# Patient Record
Sex: FEMALE | Race: WHITE | NOT HISPANIC OR LATINO | Employment: PART TIME | URBAN - METROPOLITAN AREA
[De-identification: names, ages, dates, MRNs, and addresses within clinical notes are randomized per-mention and may not be internally consistent; named-entity substitution may affect disease eponyms.]

---

## 2021-04-27 ENCOUNTER — TRANSFERRED RECORDS (OUTPATIENT)
Dept: HEALTH INFORMATION MANAGEMENT | Facility: CLINIC | Age: 26
End: 2021-04-27

## 2021-04-27 LAB — PAP-ABSTRACT: NORMAL

## 2021-05-05 ENCOUNTER — OFFICE VISIT (OUTPATIENT)
Dept: URGENT CARE | Facility: URGENT CARE | Age: 26
End: 2021-05-05
Payer: COMMERCIAL

## 2021-05-05 VITALS
OXYGEN SATURATION: 97 % | TEMPERATURE: 98.4 F | HEART RATE: 88 BPM | DIASTOLIC BLOOD PRESSURE: 74 MMHG | SYSTOLIC BLOOD PRESSURE: 113 MMHG | RESPIRATION RATE: 16 BRPM

## 2021-05-05 DIAGNOSIS — R07.0 THROAT PAIN: ICD-10-CM

## 2021-05-05 DIAGNOSIS — R50.9 FEVER AND CHILLS: Primary | ICD-10-CM

## 2021-05-05 DIAGNOSIS — J06.9 VIRAL URI: ICD-10-CM

## 2021-05-05 LAB
DEPRECATED S PYO AG THROAT QL EIA: NEGATIVE
SARS-COV-2 RNA RESP QL NAA+PROBE: NORMAL
SPECIMEN SOURCE: NORMAL
STREP GROUP A PCR: NOT DETECTED

## 2021-05-05 PROCEDURE — 99203 OFFICE O/P NEW LOW 30 MIN: CPT | Performed by: FAMILY MEDICINE

## 2021-05-05 PROCEDURE — 87651 STREP A DNA AMP PROBE: CPT | Performed by: FAMILY MEDICINE

## 2021-05-05 PROCEDURE — U0005 INFEC AGEN DETEC AMPLI PROBE: HCPCS | Performed by: FAMILY MEDICINE

## 2021-05-05 PROCEDURE — U0003 INFECTIOUS AGENT DETECTION BY NUCLEIC ACID (DNA OR RNA); SEVERE ACUTE RESPIRATORY SYNDROME CORONAVIRUS 2 (SARS-COV-2) (CORONAVIRUS DISEASE [COVID-19]), AMPLIFIED PROBE TECHNIQUE, MAKING USE OF HIGH THROUGHPUT TECHNOLOGIES AS DESCRIBED BY CMS-2020-01-R: HCPCS | Performed by: FAMILY MEDICINE

## 2021-05-05 PROCEDURE — 99N1174 PR STATISTIC STREP A RAPID: Performed by: FAMILY MEDICINE

## 2021-05-05 RX ORDER — SUMATRIPTAN 50 MG/1
TABLET, FILM COATED ORAL PRN
COMMUNITY
Start: 2021-04-21 | End: 2022-09-29

## 2021-05-05 RX ORDER — ALBUTEROL SULFATE 90 UG/1
AEROSOL, METERED RESPIRATORY (INHALATION) PRN
COMMUNITY
Start: 2021-04-19 | End: 2022-09-29

## 2021-05-05 NOTE — PROGRESS NOTES
ASSESSMENT/  PLAN:  Fever and chills     - Symptomatic COVID-19 Virus (Coronavirus) by PCR  - Group A Streptococcus PCR Throat Swab    Throat pain     - Streptococcus A Rapid Scr w Reflx to PCR    Viral URI      discussed with the patient that a confirmatory strep culture will be performed and that she will be called if the culture is positive for strep.  We discussed other possible causes of pharyngitis including cold viruses , Covid 19 and mononucleosis.   The limitations of the mono test was discussed and that late and/or repeated testing is sometimes necessary when mononucleosis is the cause of the illness    If the test for Covid 19 is positive will need to quarantine at least 10 days and may return to work/ school if fever free at least 1 day     Symptomatic treat with gargles, lozenges, and OTC analgesic as needed. Follow-up with primary clinic if not improving.    --------------------------------------------------------------------------------------------------------------------------------    SUBJECTIVE:  Chief Complaint   Patient presents with     Fever     Fever since yesterday     Pharyngitis     Sore throat since Monday, heard to swallow. Sinus headache that moves to both ears.     Cele Kim is a 26 year old female   with a chief complaint of sore throat.  Onset of symptoms was 3 day(s) ago.    Course of illness: gradual onset, still present, worsening and constant.  Severity moderate  Current and Associated symptoms: fever, stuffy nose and sore throat  Treatment measures tried include Tylenol/Ibuprofen.  Predisposing factors include ill contact: possibly at a wedding.    PMH:  No history of chronic health conditions    ALLERGIES:  Patient has no known allergies.      MEDs  SUMAtriptan (IMITREX) 50 MG tablet, as needed  VENTOLIN  (90 Base) MCG/ACT inhaler, as needed    No current facility-administered medications on file prior to visit.       Social History     Tobacco Use     Smoking  status: Never Smoker     Smokeless tobacco: Never Used   Substance Use Topics     Alcohol use: Not on file          ROS:  CONSTITUTIONAL:  fever, chills,   INTEGUMENTARY/SKIN: NEGATIVE for worrisome rashes  or lesions  EYES: NEGATIVE for vision changes or irritation  RESP:NEGATIVE for significant cough or SOB  GI: NEGATIVE for nausea, abdominal pain, or change in bowel habits    OBJECTIVE:   /74   Pulse 88   Temp 98.4  F (36.9  C) (Oral)   Resp 16   SpO2 97%   Breastfeeding No   GENERAL APPEARANCE: alert, mild distress and cooperative  EYES: EOMI,  PERRL, conjunctiva clear  HENT: ear canals and TM's normal.  Nose normal.  Pharynx erythematous with no exudate noted.  NECK: supple, non-tender to palpation, no adenopathy noted  RESP: lungs clear to auscultation - no rales, rhonchi or wheezes  CV: regular rates and rhythm, normal S1 S2, no murmur noted  SKIN: no suspicious lesions or rashes    Results for orders placed or performed in visit on 05/05/21   Streptococcus A Rapid Scr w Reflx to PCR     Status: None    Specimen: Throat   Result Value Ref Range    Strep Specimen Description Throat     Streptococcus Group A Rapid Screen Negative NEG^Negative

## 2021-05-06 LAB
LABORATORY COMMENT REPORT: NORMAL
SARS-COV-2 RNA RESP QL NAA+PROBE: NEGATIVE
SPECIMEN SOURCE: NORMAL

## 2021-06-19 ENCOUNTER — HEALTH MAINTENANCE LETTER (OUTPATIENT)
Age: 26
End: 2021-06-19

## 2021-10-09 ENCOUNTER — HEALTH MAINTENANCE LETTER (OUTPATIENT)
Age: 26
End: 2021-10-09

## 2021-11-04 ENCOUNTER — OFFICE VISIT (OUTPATIENT)
Dept: MIDWIFE SERVICES | Facility: CLINIC | Age: 26
End: 2021-11-04
Payer: COMMERCIAL

## 2021-11-04 VITALS
HEIGHT: 64 IN | HEART RATE: 76 BPM | WEIGHT: 170.6 LBS | BODY MASS INDEX: 29.12 KG/M2 | SYSTOLIC BLOOD PRESSURE: 118 MMHG | DIASTOLIC BLOOD PRESSURE: 70 MMHG

## 2021-11-04 DIAGNOSIS — Z97.5 IUD (INTRAUTERINE DEVICE) IN PLACE: ICD-10-CM

## 2021-11-04 DIAGNOSIS — R10.2 PELVIC PAIN IN FEMALE: Primary | ICD-10-CM

## 2021-11-04 DIAGNOSIS — N76.0 BV (BACTERIAL VAGINOSIS): Primary | ICD-10-CM

## 2021-11-04 DIAGNOSIS — R53.83 LOW ENERGY: ICD-10-CM

## 2021-11-04 DIAGNOSIS — B96.89 BV (BACTERIAL VAGINOSIS): Primary | ICD-10-CM

## 2021-11-04 DIAGNOSIS — Z13.29 SCREENING FOR THYROID DISORDER: ICD-10-CM

## 2021-11-04 LAB
CLUE CELLS: PRESENT
TRICHOMONAS, WET PREP: ABNORMAL
WBC'S/HIGH POWER FIELD, WET PREP: ABNORMAL
YEAST, WET PREP: ABNORMAL

## 2021-11-04 PROCEDURE — 36415 COLL VENOUS BLD VENIPUNCTURE: CPT | Performed by: ADVANCED PRACTICE MIDWIFE

## 2021-11-04 PROCEDURE — 84443 ASSAY THYROID STIM HORMONE: CPT | Performed by: ADVANCED PRACTICE MIDWIFE

## 2021-11-04 PROCEDURE — 87210 SMEAR WET MOUNT SALINE/INK: CPT | Performed by: ADVANCED PRACTICE MIDWIFE

## 2021-11-04 PROCEDURE — 99204 OFFICE O/P NEW MOD 45 MIN: CPT | Performed by: ADVANCED PRACTICE MIDWIFE

## 2021-11-04 RX ORDER — COPPER 313.4 MG/1
1 INTRAUTERINE DEVICE INTRAUTERINE
COMMUNITY
Start: 2020-07-08 | End: 2022-05-05

## 2021-11-04 RX ORDER — FLUCONAZOLE 150 MG/1
150 TABLET ORAL ONCE
Qty: 1 TABLET | Refills: 0 | Status: SHIPPED | OUTPATIENT
Start: 2021-11-04 | End: 2021-11-04

## 2021-11-04 RX ORDER — METRONIDAZOLE 500 MG/1
500 TABLET ORAL 2 TIMES DAILY
Qty: 14 TABLET | Refills: 0 | Status: SHIPPED | OUTPATIENT
Start: 2021-11-04 | End: 2021-11-11

## 2021-11-04 ASSESSMENT — MIFFLIN-ST. JEOR: SCORE: 1508.84

## 2021-11-04 NOTE — Clinical Note
Please abstract the following data from this visit with this patient into the appropriate field in Epic:    Other Tests found in the patient's chart through Chart Review/Care Everywhere:    Pap smear done by [facility] this date: 4/27/21

## 2021-11-04 NOTE — PROGRESS NOTES
"  SUBJECTIVE:                                                   Loraine Kim is a 26 year old who presents to clinic today for the following health issue(s):  Patient presents with:  Pelvic Pain: c/o right side pelvic pain for about 1 month off and on and is getting worse, as well as some painful intercourse; has had ovarian cysts in past and it doesn't feel the same    Discuss PCOS, pt questions if any of her sx are from that. Never been dx. See cycle history below.    Would also like to discuss getting thyroid tested due to family hx of hypothyroidism. C/o low energy.    HPI:  Here for evaluation for pelvic pain. Pain is on  R side of abdomen,  achy in nature. Has been happening off/on for a month and getting worse. Feels area of pain is \"walnut\" size. \"Not bigger but maybe \"angerier.\" Last night, when coughing, \"felt a small water balloon, I can feel it moving and it hurt when it moved.\"  When pain occurs, occurs in 1-2 minute bursts. When days are worse, it happens every 15 minutes for a couple hours and then goes away.   If pain is already present, walking will make it worse. Sometimes pain randomly happens. No activity she feels brings the discomfort on.     Occasionally use Tylenol or Advil, or a heating pad. Heating pad seems to help. Does not like taking Advil much, gives her upset stomach    Pain with sex. \"Feels like \"it\" gets hit and then it starts hurting. No bleeding during sex. Painful in all sexual positions. Does not hurt everytime she has sex. Does not continue after sex.     Has had ovarian cysts before. This pain feels different than before. Ovarian cyst she had before was on her L side.     Has had yeast infections since getting Paragard. Has probably had 5. Paragard placed July 2020.  Standing prescription at [facility] for Diflucan. She wonders if there is something more she can do to prevent yeast infections.     Denies history of STDs. Suggested STD screening today, but declined.     Also " requesting a TSH level drawn today. Has a family hx of hypothyroidism and c/o low energy. Has never had a thyroid level drawn.     Patient's last menstrual period was 10/14/2021 (exact date).  Menstrual History: irregular, wonders about possibly having PCOS. Cycles are q 45 days, lasting 7 days.  Medium flow.  Patient is sexually active  No obstetric history on file.  Using IUD for contraception. [facility] updated:  Yes    Please abstract the following data from this visit with this patient into the appropriate field in Epic:    Other Tests found in the patient's chart through Chart Review/Care Everywhere:    Pap smear done by this group [facility] on this date: 4/27/21        PHQ-2 Score:     PHQ-2 ( 1999 Pf[employer]) 11/4/2021   Q1: Little interest or pleasure in doing things 0   Q2: Feeling down, depressed or hopeless 0   PHQ-2 Score 0           Problem list and histories reviewed & adjusted, as indicated.  Additional history: as documented.    There is no problem list on file for this patient.    Past Surgical History:   Procedure Laterality Date     WISDOM TOOTH EXTRACTION        Social History     Tobacco Use     Smoking status: Never Smoker     Smokeless tobacco: Never Used   Substance Use Topics     Alcohol use: Not on file      Problem (# of Occurrences) Relation (Name,Age of Onset)    Diabetes (1) Paternal Grandmother    Gout (1) Father    Hypothyroidism (2) Cousin, Maternal Aunt    Kidney Disease (1) Mother    Myocardial Infarction (1) Paternal Grandfather    No Known Problems (4) Sister, Brother, Maternal Grandmother, Maternal Grandfather            Current Outpatient Medications   Medication Sig     Multiple Vitamin (MULTIVITAMIN PO)      paragard intrauterine copper device 1 Device by Intrauterine route     SUMAtriptan (IMITREX) 50 MG tablet as needed     VENTOLIN  (90 Base) MCG/ACT inhaler as needed     metroNIDAZOLE (FLAGYL) 500 MG tablet Take 1 tablet (500 mg) by mouth 2  "times daily for 7 days     No current facility-administered medications for this visit.     No Known Allergies    ROS:  Genitourinary: Cramps, Irregular Menses and Pelvic Pain  Endocrine: Excess Hair Growth  12 point review of systems negative other than symptoms noted below.    OBJECTIVE:     /70   Pulse 76   Ht 1.626 m (5' 4\")   Wt 77.4 kg (170 lb 9.6 oz)   LMP 10/14/2021 (Exact Date)   BMI 29.28 kg/m    Body mass index is 29.28 kg/m .    PHYSICAL EXAM:  Constitutional:  Appearance: Well nourished, well developed alert, in no acute distress  Pelvic Exam:  External Genitalia:     Normal appearance for age, no discharge present, no tenderness present, no inflammatory lesions present, color normal  Vagina:    Normal vaginal vault without central or paravaginal defects, no discharge present, no inflammatory lesions present, no masses present  Bladder:     Nontender to palpation  Urethra:   Urethral Body:  Urethra palpation normal, urethra structural support normal   Urethral Meatus:  No erythema or lesions present  Cervix:     Appearance healthy, no lesions present, nontender to palpation, no bleeding present, strings present  Uterus:     Nontender to palpation, no masses present, position anteflexed, mobility: normal  Adnexa:      adnexal tenderness present on R side, no adnexal masses palpated  Perineum:     Perineum within normal limits, no evidence of trauma, no rashes or skin lesions present  Pubic Hair:     Normal pubic hair distribution for age  Genitalia and Groin:     No rashes present, no lesions present, no areas of discoloration, no masses present       In-Clinic Test Results:  No results found for this or any previous visit (from the past 24 hour(s)).    ASSESSMENT/PLAN:                                                        ICD-10-CM    1. Pelvic pain in female  R10.2 US Transvaginal Non OB     Wet prep - lab collect     Wet prep - lab collect   2. Screening for thyroid disorder  Z13.29 TSH " with free T4 reflex     TSH with free T4 reflex   3. IUD (intrauterine device) in place  Z97.5    4. Low energy  R53.83        COUNSELING:  Begin with evaluation of pelvic pain today. Will need a separate appt to discuss PCOS further.  Given 45 day menstrual cycles this may be of concern.    TSH level drawn today for c/o low energy. Discussed if abnormal, may be contributing to long menstrual cycles    Paragard in place, did not feel the need to complete a urine hcg. Last menstrual cycle was on 10/14/2021    Not due for a Pap    Wet prep to r/o vaginal infection. Discussed boric acid therapy.    Discussed US for further w/u of pelvic pain. May need to f/u with MD if abnormal.     30 minutes spent on the date of the encounter doing chart review, review of test results, patient visit and documentation     Lili ANDERSEN CNM

## 2021-11-05 LAB — TSH SERPL DL<=0.005 MIU/L-ACNC: 2.5 MU/L (ref 0.4–4)

## 2021-11-15 ENCOUNTER — IMMUNIZATION (OUTPATIENT)
Dept: NURSING | Facility: CLINIC | Age: 26
End: 2021-11-15
Payer: COMMERCIAL

## 2021-11-15 PROCEDURE — 90471 IMMUNIZATION ADMIN: CPT

## 2021-11-15 PROCEDURE — 0064A COVID-19,PF,MODERNA (18+ YRS BOOSTER .25ML): CPT

## 2021-11-15 PROCEDURE — 91306 COVID-19,PF,MODERNA (18+ YRS BOOSTER .25ML): CPT

## 2021-11-15 PROCEDURE — 90686 IIV4 VACC NO PRSV 0.5 ML IM: CPT

## 2021-11-18 ENCOUNTER — HOSPITAL ENCOUNTER (OUTPATIENT)
Dept: ULTRASOUND IMAGING | Facility: CLINIC | Age: 26
Discharge: HOME | End: 2021-11-18
Attending: ADVANCED PRACTICE MIDWIFE | Admitting: ADVANCED PRACTICE MIDWIFE
Payer: COMMERCIAL

## 2021-11-18 DIAGNOSIS — R10.2 PELVIC PAIN IN FEMALE: ICD-10-CM

## 2021-11-18 PROCEDURE — 76830 TRANSVAGINAL US NON-OB: CPT

## 2021-12-03 ENCOUNTER — NURSE TRIAGE (OUTPATIENT)
Dept: NURSING | Facility: CLINIC | Age: 26
End: 2021-12-03
Payer: COMMERCIAL

## 2021-12-03 NOTE — TELEPHONE ENCOUNTER
"Patient reports this morning she had \"horrible cramps\", felt like \"period cramps\", pain 7/10    Currently pain in abdomen 0/10.    Nick IUD was placed on July 7, 2020.  Patient noted today that \"strings are not present\".  Denies vaginal bleeding.    Patient reports history of sharp pain to right side of abdomen that led to ultra sound on 11/18/2021.  Patient states that Provider stated that \"IUD placement was at an angle\"    According to the protocol, patient should be seen within three days in office.  Care advice given. Patient verbalizes understanding and agrees with plan of care. Transferred to scheduling.     Shanon Johnson RN  12/03/21 3:44 PM  [facility] Nurse Advisor     COVID 19 Nurse Triage Plan/Patient Instructions    Please be aware that novel coronavirus (COVID-19) may be circulating in the community. If you develop symptoms such as fever, cough, or SOB or if you have concerns about the presence of another infection including coronavirus (COVID-19), please contact your health care provider or visit [email or web address].     Disposition/Instructions    In-Person Visit with provider recommended. Reference Visit Selection Guide.    Thank you for taking steps to prevent the spread of this virus.  o Limit your contact with others.  o Wear a simple mask to cover your cough.  o Wash your hands well and often.    Resources    [facility]: About COVID-19: [email or web address]    CDC: What to Do If You're Sick: www.cdc.gov/coronavirus/2019-ncov/about/steps-when-sick.html    CDC: Ending Home Isolation: www.cdc.gov/coronavirus/2019-ncov/hcp/disposition-in-home-patients.html     CDC: Caring for Someone: www.cdc.gov/coronavirus/2019-ncov/if-you-are-sick/care-for-someone.html     [facility]: Interim Guidance for Hospital Discharge to Home: www.health.[facility].[place]/diseases/coronavirus/hcp/hospdischarge.pdf    [facility] clinical trials (COVID-19 research studies): " clinicalaffairs.[facility]-clinical-trials     Below are the COVID-19 hotlines at the [place] Department of Health ([facility]). Interpreters are available.   o For health questions: Call 764-687-1075 or 1-798.375.8113 (7 a.m. to 7 p.m.)  o For questions about schools and childcare: Call 803-942-7451 or 1-718.457.9582 (7 a.m. to 7 p.m.)     Reason for Disposition    Worried that IUD is not in right place (e.g., not able to feel the IUD string, string longer than usual, can feel hard plastic of IUD)    Additional Information    Negative: Shock suspected (e.g., cold/pale/clammy skin, too weak to stand, low BP, rapid pulse)    Negative: Sounds like a life-threatening emergency to the triager    Negative: Abdominal pain and pregnant < 20 weeks    Negative: Vaginal bleeding and pregnant < 20 weeks    Negative: Vaginal discharge is main symptom    Negative: SEVERE abdominal pain (e.g., excruciating) and present > 1 hour    Negative: SEVERE vaginal bleeding (e.g., soaking 2 pads or tampons per hour) and present 2 or more hours    Negative: MODERATE vaginal bleeding (e.g., soaking 1 pad or tampon per hour and present > 6 hours)    Negative: Constant abdominal pain and present > 2 hours    Negative: Patient sounds very sick or weak to the triager    Negative: Caller has URGENT question and triager unable to answer question    Negative: MILD abdominal pain (e.g., does not interfere with normal activities) that comes and goes (cramps) and present > 48 hours    Negative: Pregnant    Negative: Periods with > 6 soaked pads or tampons per day and lasts > 7 days    Protocols used: CONTRACEPTION - IUD SYMPTOMS AND [id number]

## 2021-12-06 ENCOUNTER — OFFICE VISIT (OUTPATIENT)
Dept: OBGYN | Facility: CLINIC | Age: 26
End: 2021-12-06
Payer: COMMERCIAL

## 2021-12-06 VITALS
DIASTOLIC BLOOD PRESSURE: 62 MMHG | BODY MASS INDEX: 28.94 KG/M2 | WEIGHT: 168.6 LBS | SYSTOLIC BLOOD PRESSURE: 109 MMHG | OXYGEN SATURATION: 97 % | HEART RATE: 60 BPM

## 2021-12-06 DIAGNOSIS — Z30.432 ENCOUNTER FOR REMOVAL OF INTRAUTERINE CONTRACEPTIVE DEVICE: ICD-10-CM

## 2021-12-06 DIAGNOSIS — Z30.09 BIRTH CONTROL COUNSELING: Primary | ICD-10-CM

## 2021-12-06 PROCEDURE — 99213 OFFICE O/P EST LOW 20 MIN: CPT | Mod: 25 | Performed by: OBSTETRICS & GYNECOLOGY

## 2021-12-06 PROCEDURE — 58301 REMOVE INTRAUTERINE DEVICE: CPT | Performed by: OBSTETRICS & GYNECOLOGY

## 2021-12-06 RX ORDER — LEVONORGESTREL 1.5 MG/1
1.5 TABLET ORAL ONCE
Qty: 1 TABLET | Refills: 3 | Status: SHIPPED | OUTPATIENT
Start: 2021-12-06 | End: 2022-09-29

## 2021-12-06 NOTE — PATIENT INSTRUCTIONS
Patient Education     Birth Control Methods  Birth control methods are used to help prevent pregnancy. There are many different methods to choose from. Talk with your healthcare provider about which method is right for you. Be sure to ask your provider how well each one works. Also ask about the benefits, risks, and side effects of each method.   Hormones  Some birth control methods work by releasing hormones such as progestin and estrogen. These methods include hormone implants, hormone shots, the vaginal ring, the patch, and birth control pills. They all work by stopping the release of the egg from the ovary (ovulation). All of these methods work well and can be stopped at any time.     The implant is a small device that needs to be placed in the upper arm by a trained healthcare provider. It works for up to 3 years.    Hormone injections must be repeated every 3 months.    The vaginal ring must be replaced monthly. It can be removed during the fourth week of each cycle.    The patch must be replaced weekly. It's not worn during the fourth week of each cycle.    Birth control pills must be taken every day.  Intrauterine device (IUD)  An IUD is a small, T-shaped device. It must be placed in the uterus by a trained healthcare provider. There are different types of IUDs available. They work by causing changes in the uterus that make it harder for sperm to reach the egg. Depending on the type of IUD you have, it may work for several years or longer. The IUD is a reversible birth control method. This means it can be removed at any time.   Condom  A condom is a sheath that forms a thin barrier between the penis and the vagina. It helps prevent pregnancy by keeping sperm from entering the vagina. When latex condoms are used, they have the added benefit of protecting against most STIs (sexually transmitted infections). Condoms are used each time there is sexual intercourse and should be discarded after each use. Ask  your healthcare provider about the different types of condoms available. These include both the male condom and female condom.   Spermicide  Spermicides come as foams, jellies, creams, suppositories, and tablets.  They help prevent pregnancy by killing sperm. When used alone they are not that reliable. They work best when combined with other birth control methods such as diaphragms and cervical caps.   Sponge, diaphragm, and cervical cap   All of these methods help prevent pregnancy by covering the opening of the uterus (cervix). This prevents sperm from passing through.   The sponge contains spermicide. It can be bought over the counter. The sponge must be left in place for at least 6 hours after the last time you have sex. However, it should not stay in place for more than 24 hours. Discard after use.   The diaphragm and cervical cap must be fitted and prescribed by your healthcare provider. Both are used with spermicide. The diaphragm must be left in place for at least 6 hours after sex. However, it should not stay in place for more than 24 hours. It can be washed and reused. The cervical cap must be left in place for at least 6 hours after sex. However, it should not stay in place for more than 48 hours. It can be washed and reused.   Withdrawal method  This is when the man pulls his penis out of the vagina just before ejaculation ( coming ). This lowers the amount of sperm entering the vagina. Be aware that fluids released just before ejaculation often still contain some sperm, so this method is not as reliable as certain other methods.   Rhythm method   This method is also call natural family planning or fertility awareness. It requires that you know when in your menstrual cycle you are likely to become pregnant. Then you not have sex during those days. This requires careful planning and good discipline. Your healthcare provider can explain more about how this works.   Tubal ligation and vasectomy  These are  surgical methods to prevent pregnancy. Tubal ligation is an option for women. The fallopian tubes are blocked or cut (ligated). This keeps the egg from passing into the uterus or sperm from reaching the egg. Vasectomy is an option for men. The tubes that normally carry sperm to the penis are either closed or blocked. Both tubal ligation and vasectomy are permanent birth control methods. This means reversal is either not possible or unlikely to work. They are good choices for women and men who know that they don't want to have children in the future.   [employer] last reviewed this educational content on 7/1/2020 2000-2021 [employer]. All rights reserved. This information is not intended as a substitute for professional medical care. Always follow your healthcare professional's instructions.

## 2021-12-06 NOTE — PROGRESS NOTES
[facility]- OBGYN    CC: birth control consult.  Ultrasound review    S:Loraine Kim is a 26 year old  who presents today for ultrasound review and birth control consult.      Per chart review:  2020- patient had Paragard IUD inserted  21- patient complained of IUD strings long, trimmed  21- patient seen for pain with intercourse.  21-  US PELVIC COMPLETE WITH TRANSVAGINAL 2021 3:24 PM     CLINICAL HISTORY: Pelvic pain.  TECHNIQUE: Transabdominal scans were performed. Endovaginal ultrasound  was performed to better visualize the adnexa.     COMPARISON: 10/6/2017.     FINDINGS:  UTERUS: 6.1 x 4.3 x 3 cm. Normal in size and position with no masses.  ENDOMETRIUM: IUD appears positioned somewhat obliquely within the  lower uterine segment. The endometrium is unremarkable where  visualized, measuring 3 mm.  RIGHT OVARY: 3.9 x 2.6 x 2.1 cm. Normal with flow demonstrated.  LEFT OVARY: 3.1 x 3.2 x 2.1 cm. Normal with flow demonstrated   No significant free fluid.                                                             IMPRESSION:  1.  IUD appears positioned somewhat obliquely in the lower uterine  segment.  2.  Otherwise unremarkable pelvic ultrasound examination.  MAYURI ANTONIO MD      Patient reports she continues to have sharp intermittent right sided pelvic pain with intercourse.  She also notes numerous yeast infections with this paragard IUD.  She has used the Mirena IUD in the past and had numerous ovarian cysts with its use.  She did not like the way the POPs affected her mood.     OBGYN Hx:   OB History    Para Term  AB Living   0 0 0 0 0 0   SAB IAB Ectopic Multiple Live Births   0 0 0 0 0       Patient's last menstrual period was 2021 (exact date).  Menses:regular, q30-40 days.  Lasts 6-7 days  Sexually active with male partner  STD Hx:none  Pap hx:21 NIL    PMH: asthma, migraines with aura  PSH: tooth  extraction  Meds:imitrex  Allergies:NKDA  SH: Denies any tobacco or drug use.  Consumes 4 drinks per week.    O: Patient Vitals for the past 24 hrs:   BP Pulse SpO2 Weight   21 0815 109/62 60 97 % 76.5 kg (168 lb 9.6 oz)   ]  Exam:  General- awake, alert, answering questions appropriately, appears comfortable  CV- regular rate  Lung- breathing comfortably on room air  - normal appearing external genitalia, normal appearing vaginal mucosa, normal appearing cervix with IUD strings visualized.  Small amount of thin white vaginal discharge.    A&P: Loraine Kim is a 26 year old  who presents today for ultrasound review and birth control consult.      (Z30.09) Birth control counseling  (primary encounter diagnosis)  Comment: Reviewed ultrasound findings with patient and given pain and IUD in incorrect position, recommend removal.  Patient has history of migraines with aura, thus estrogen containing contraception is not safe option.  Discussed options including replacement of IUD (copper or levonorgestrel) vs. Depo provera vs. POPs vs. Nexplanon vs. Condoms.  Following discussion patient would prefer IUD removed and condoms for birth control.  Discussed condom use 100% of the time and Plan B on hand if condom failure.  Patient agrees  Plan: levonorgestrel (PLAN B) 1.5 MG tablet        See below procedure for IUD removal.     (Z30.432) Encounter for removal of intrauterine contraceptive device  Comment: IUD in incorrect position and causing pain.   Plan: REMOVE INTRAUTERINE DEVICE      IUD Removal:  SUBJECTIVE:    Is a pregnancy test required: No.  Was a consent obtained?  Yes    PROCEDURE:    A speculum exam was performed and the cervix was visualized. The IUD string was visualized. Using ring forceps, the string was grasped and the IUD removed intact.    POST PROCEDURE:    The patient tolerated the procedure well. Patient was discharged in stable condition.    Call if bleeding, pain or fever occur. and  Birth control counseling given.    Tram Hinds MD

## 2022-05-05 ENCOUNTER — OFFICE VISIT (OUTPATIENT)
Dept: OBGYN | Facility: CLINIC | Age: 27
End: 2022-05-05
Payer: COMMERCIAL

## 2022-05-05 VITALS
HEIGHT: 64 IN | DIASTOLIC BLOOD PRESSURE: 62 MMHG | WEIGHT: 169 LBS | SYSTOLIC BLOOD PRESSURE: 122 MMHG | BODY MASS INDEX: 28.85 KG/M2

## 2022-05-05 DIAGNOSIS — N89.8 VAGINAL DISCHARGE: Primary | ICD-10-CM

## 2022-05-05 LAB
CLUE CELLS: NORMAL
TRICHOMONAS, WET PREP: NORMAL
WBC'S/HIGH POWER FIELD, WET PREP: NORMAL
YEAST, WET PREP: NORMAL

## 2022-05-05 PROCEDURE — 99213 OFFICE O/P EST LOW 20 MIN: CPT | Performed by: NURSE PRACTITIONER

## 2022-05-05 PROCEDURE — 87102 FUNGUS ISOLATION CULTURE: CPT | Performed by: NURSE PRACTITIONER

## 2022-05-05 PROCEDURE — 87210 SMEAR WET MOUNT SALINE/INK: CPT | Performed by: NURSE PRACTITIONER

## 2022-05-05 PROCEDURE — 87205 SMEAR GRAM STAIN: CPT | Performed by: NURSE PRACTITIONER

## 2022-05-05 NOTE — PROGRESS NOTES
SUBJECTIVE:                                                   Loraine Kim is a 27 year old female who presents to clinic today for the following health issue(s):  Patient presents with:  Vaginal Problem: C/o dyspareunia, RLQ pain, vaginal itching and increased vaginal discharge. Using boric acid with no relief. Pt also reports spotting between periods since Paragard IUD was removed 2021.      HPI:  New patient to me here today with concerns of frequent vaginal infections that are not responding to boric acid suppositories.  She also has concerns about pelvic pain with intercourse that seems to be positional.  She most recently started having minimal spotting in between her periods.  She has no new sexual partners and declines STD testing at this time.  She denies any chance of pregnancy as they use condoms 100% of the time.  She is not on any hormonal contraception.    Patient's last menstrual period was 2022.    Patient is sexually active, .  Using condoms for contraception.    reports that she has never smoked. She has never used smokeless tobacco.    STD testing offered?  Declined    Health maintenance updated:  yes    Today's PHQ-2 Score:   PHQ-2 (  [employer]) 2021   Q1: Little interest or pleasure in doing things 0   Q2: Feeling down, depressed or hopeless 0   PHQ-2 Score 0   PHQ-2 Total Score (12-17 Years)- Positive if 3 or more points; Administer PHQ-A if positive 0     Today's PHQ-9 Score: No flowsheet data found.  Today's DEMETRIO-7 Score: No flowsheet data found.    Problem list and histories reviewed & adjusted, as indicated.  Additional history: as documented.    There is no problem list on file for this patient.    Past Surgical History:   Procedure Laterality Date     WISDOM TOOTH EXTRACTION        Social History     Tobacco Use     Smoking status: Never Smoker     Smokeless tobacco: Never Used   Substance Use Topics     Alcohol use: Not on file      Problem (# of Occurrences)  "Relation (Name,Age of Onset)    Diabetes (1) Paternal Grandmother    Gout (1) Father    Hypothyroidism (2) Cousin, Maternal Aunt    Kidney Disease (1) Mother    Myocardial Infarction (1) Paternal Grandfather    No Known Problems (4) Sister, Brother, Maternal Grandmother, Maternal Grandfather            Current Outpatient Medications   Medication Sig     boric acid 600 mg vaginal suppository - PHARMACY TO [facility] 600 mg vaginally     levonorgestrel (PLAN B) 1.5 MG tablet Take 1 tablet (1.5 mg) by mouth once for 1 dose     Multiple Vitamin (MULTIVITAMIN PO)      SUMAtriptan (IMITREX) 50 MG tablet as needed     VENTOLIN  (90 Base) MCG/ACT inhaler as needed     No current facility-administered medications for this visit.     No Known Allergies    ROS:  12 point review of systems negative other than symptoms noted below or in the HPI.  Genitourinary: Cramps, Painful [place], Spotting, Vaginal Discharge and Vaginal Itching  No urinary frequency or dysuria, bladder or kidney problems, Normal menstrual cycles, POSITIVE for:, vaginal discharge      OBJECTIVE:     /62   Ht 1.626 m (5' 4\")   Wt 76.7 kg (169 lb)   LMP 04/12/2022   BMI 29.01 kg/m    Body mass index is 29.01 kg/m .    Exam:  Constitutional:  Appearance: Well nourished, well developed alert, in no acute distress  Psychiatric:  Mentation appears normal and affect normal/bright.  Pelvic Exam:  External Genitalia:     Normal appearance for age, no discharge present, no tenderness present, no inflammatory lesions present, color normal  Vagina:     Normal vaginal vault without central or paravaginal defects, no discharge present, no inflammatory lesions present, no masses present  Bladder:     Nontender to palpation  Urethra:   Urethral Body:  Urethra palpation normal, urethra structural support normal   Urethral Meatus:  No erythema or lesions present  Cervix:     Appearance healthy, no lesions present, nontender to palpation, no " bleeding present Clear midcycle discharge present  Uterus:     Uterus: firm, normal sized and nontender, anteverted in position.   Adnexa:     Right adnexal tenderness present, no adnexal masses present  Perineum:     Perineum within normal limits, no evidence of trauma, no rashes or skin lesions present  Anus:     Anus within normal limits, no hemorrhoids present  Inguinal Lymph Nodes:     No lymphadenopathy present  Pubic Hair:    Normal pubic hair distribution for age  Genitalia and Groin:     No rashes present, no lesions present, no areas of discoloration, no masses present       In-Clinic Test Results:  Results for orders placed or performed in visit on 05/05/22 (from the past 24 hour(s))   Bacterial Vaginosis Smear    Specimen: Vagina; Swab    Narrative    The following orders were created for panel order Bacterial Vaginosis Smear.  Procedure                               Abnormality         Status                     ---------                               -----------         ------                     Bacterial Vaginosis Stain[[id number]]                                                     Please view results for these tests on the individual orders.   Wet prep - Clinic Collect    Specimen: Vagina; Swab   Result Value Ref Range    Trichomonas Absent Absent    Yeast Absent Absent    Clue Cells Absent Absent    WBCs/high power field None None    Narrative    Lacto present       ASSESSMENT/PLAN:                                                        ICD-10-CM    1. Vaginal discharge  N89.8 Yeast culture     Bacterial Vaginosis Smear     Wet prep - Clinic Collect       There are no Patient Instructions on file for this visit.    27-year-old female with some right adnexal tenderness on exam today.  She appears to be about midcycle.  Wet prep: negative  Vaginal cultures will be sent.  Recommended pre-/probiotics and diet changes- low fodmap      GANESH Ford CNP  [facility]  [place]

## 2022-05-06 LAB
NUGENT SCORE: 0
WHITE BLOOD CELLS: NORMAL

## 2022-05-09 LAB — BACTERIA SPEC CULT: NO GROWTH

## 2022-07-16 ENCOUNTER — HEALTH MAINTENANCE LETTER (OUTPATIENT)
Age: 27
End: 2022-07-16

## 2022-09-17 ENCOUNTER — HEALTH MAINTENANCE LETTER (OUTPATIENT)
Age: 27
End: 2022-09-17

## 2022-09-26 ASSESSMENT — ENCOUNTER SYMPTOMS
JOINT SWELLING: 0
FEVER: 0
SORE THROAT: 0
NERVOUS/ANXIOUS: 0
MYALGIAS: 0
HEADACHES: 0
DIARRHEA: 1
PARESTHESIAS: 0
NAUSEA: 0
ARTHRALGIAS: 0
COUGH: 1
HEMATOCHEZIA: 0
HEMATURIA: 0
FREQUENCY: 0
DIZZINESS: 1
SHORTNESS OF BREATH: 0
WEAKNESS: 0
CHILLS: 0
ABDOMINAL PAIN: 0
HEARTBURN: 0
PALPITATIONS: 0
CONSTIPATION: 0
BREAST MASS: 1
EYE PAIN: 0
DYSURIA: 0

## 2022-09-29 ENCOUNTER — OFFICE VISIT (OUTPATIENT)
Dept: FAMILY MEDICINE | Facility: CLINIC | Age: 27
End: 2022-09-29
Payer: COMMERCIAL

## 2022-09-29 VITALS
HEART RATE: 65 BPM | RESPIRATION RATE: 14 BRPM | HEIGHT: 64 IN | WEIGHT: 173.4 LBS | TEMPERATURE: 98 F | OXYGEN SATURATION: 97 % | SYSTOLIC BLOOD PRESSURE: 120 MMHG | DIASTOLIC BLOOD PRESSURE: 72 MMHG | BODY MASS INDEX: 29.6 KG/M2

## 2022-09-29 DIAGNOSIS — J45.20 MILD INTERMITTENT ASTHMA WITHOUT COMPLICATION: ICD-10-CM

## 2022-09-29 DIAGNOSIS — Z11.59 NEED FOR HEPATITIS C SCREENING TEST: ICD-10-CM

## 2022-09-29 DIAGNOSIS — Z00.00 ROUTINE GENERAL MEDICAL EXAMINATION AT A HEALTH CARE FACILITY: Primary | ICD-10-CM

## 2022-09-29 DIAGNOSIS — Z11.4 SCREENING FOR HIV (HUMAN IMMUNODEFICIENCY VIRUS): ICD-10-CM

## 2022-09-29 DIAGNOSIS — R42 DIZZINESS: ICD-10-CM

## 2022-09-29 DIAGNOSIS — G43.909 MIGRAINE WITHOUT STATUS MIGRAINOSUS, NOT INTRACTABLE, UNSPECIFIED MIGRAINE TYPE: ICD-10-CM

## 2022-09-29 DIAGNOSIS — Z13.220 ENCOUNTER FOR SCREENING FOR LIPID DISORDER: ICD-10-CM

## 2022-09-29 DIAGNOSIS — Z23 HIGH PRIORITY FOR 2019-NCOV VACCINE: ICD-10-CM

## 2022-09-29 PROBLEM — F41.0 PANIC ATTACK: Status: ACTIVE | Noted: 2017-09-15

## 2022-09-29 PROCEDURE — 99395 PREV VISIT EST AGE 18-39: CPT | Mod: 25 | Performed by: INTERNAL MEDICINE

## 2022-09-29 PROCEDURE — 0134A COVID-19,PF,MODERNA BIVALENT: CPT | Performed by: INTERNAL MEDICINE

## 2022-09-29 PROCEDURE — 90471 IMMUNIZATION ADMIN: CPT | Performed by: INTERNAL MEDICINE

## 2022-09-29 PROCEDURE — 91313 COVID-19,PF,MODERNA BIVALENT: CPT | Performed by: INTERNAL MEDICINE

## 2022-09-29 PROCEDURE — 99214 OFFICE O/P EST MOD 30 MIN: CPT | Mod: 25 | Performed by: INTERNAL MEDICINE

## 2022-09-29 PROCEDURE — 90686 IIV4 VACC NO PRSV 0.5 ML IM: CPT | Performed by: INTERNAL MEDICINE

## 2022-09-29 RX ORDER — ALBUTEROL SULFATE 90 UG/1
1 AEROSOL, METERED RESPIRATORY (INHALATION) EVERY 6 HOURS PRN
Qty: 18 G | Refills: 1 | Status: SHIPPED | OUTPATIENT
Start: 2022-09-29 | End: 2024-03-26

## 2022-09-29 RX ORDER — SUMATRIPTAN 50 MG/1
50 TABLET, FILM COATED ORAL
Qty: 20 TABLET | Refills: 3 | Status: SHIPPED | OUTPATIENT
Start: 2022-09-29 | End: 2024-03-26

## 2022-09-29 ASSESSMENT — ENCOUNTER SYMPTOMS
DYSURIA: 0
NAUSEA: 0
PARESTHESIAS: 0
ABDOMINAL PAIN: 0
HEADACHES: 0
ARTHRALGIAS: 0
SHORTNESS OF BREATH: 0
WEAKNESS: 0
DIZZINESS: 1
PALPITATIONS: 0
DIARRHEA: 1
COUGH: 1
HEMATURIA: 0
EYE PAIN: 0
JOINT SWELLING: 0
CONSTIPATION: 0
FREQUENCY: 0
HEARTBURN: 0
MYALGIAS: 0
SORE THROAT: 0
BREAST MASS: 1
FEVER: 0
NERVOUS/ANXIOUS: 0
CHILLS: 0
HEMATOCHEZIA: 0

## 2022-09-29 ASSESSMENT — PAIN SCALES - GENERAL: PAINLEVEL: NO PAIN (0)

## 2022-09-29 NOTE — PROGRESS NOTES
SUBJECTIVE:   CC: Loraine is an 27 year old who presents for preventive health visit.       Patient has been advised of split billing requirements and indicates understanding: Yes  Healthy Habits:     Getting at least 3 servings of Calcium per day:  Yes    Bi-annual eye exam:  Yes    Dental care twice a year:  NO    Sleep apnea or symptoms of sleep apnea:  None    Diet:  Regular (no restrictions)    Frequency of exercise:  4-5 days/week    Duration of exercise:  15-30 minutes    Taking medications regularly:  Yes    Medication side effects:  Not applicable    PHQ-2 Total Score: 0    Additional concerns today:  Yes      Today's PHQ-2 Score:   PHQ-2 ( 1999 [employer]) 9/26/2022   Q1: Little interest or pleasure in doing things 0   Q2: Feeling down, depressed or hopeless 0   PHQ-2 Score 0   PHQ-2 Total Score (12-17 Years)- Positive if 3 or more points; Administer PHQ-A if positive -   Q1: Little interest or pleasure in doing things Not at all   Q2: Feeling down, depressed or hopeless Not at all   PHQ-2 Score 0       Abuse: Current or Past (Physical, Sexual or Emotional) - No  Do you feel safe in your environment? Yes    Have you ever done Advance Care Planning? (For example, a Health Directive, POLST, or a discussion with a medical provider or your loved ones about your wishes): No, advance care planning information given to patient to review.  Patient plans to discuss their wishes with loved ones or provider.      Social History     Tobacco Use     Smoking status: Never Smoker     Smokeless tobacco: Never Used   Substance Use Topics     Alcohol use: Yes     If you drink alcohol do you typically have >3 drinks per day or >7 drinks per week? Yes  \  Alcohol Use 9/26/2022   Prescreen: >3 drinks/day or >7 drinks/week? No   No flowsheet data found.    Reviewed orders with patient.  Reviewed health maintenance and updated orders accordingly - Yes  Lab work is in process  Labs reviewed in EPIC    Breast Cancer  Screening:    FHS-7:   Breast CA Risk Assessment (FHS-7) 2022   Did any of your first-degree relatives have breast or ovarian cancer? No   Did any of your relatives have bilateral breast cancer? Unknown   Did any man in your family have breast cancer? No   Did any woman in your family have breast and ovarian cancer? Yes   Did any woman in your family have breast cancer before age 50 y? No   Do you have 2 or more relatives with breast and/or ovarian cancer? No   Do you have 2 or more relatives with breast and/or bowel cancer? Yes     click delete button to remove this line now  Patient under 40 years of age: Routine Mammogram Screening not recommended.   Pertinent mammograms are reviewed under the imaging tab.    History of abnormal Pap smear: NO - age 21-29 PAP every 3 years recommended     Reviewed and updated as needed this visit by clinical staff   Tobacco  Allergies  Meds                Reviewed and updated as needed this visit by Provider                   Past Medical History:   Diagnosis Date     Migraine with aura      Mild asthma      Ovarian cyst       Past Surgical History:   Procedure Laterality Date     BIOPSY  2009    Benign     WISDOM TOOTH EXTRACTION       OB History    Para Term  AB Living   0 0 0 0 0 0   SAB IAB Ectopic Multiple Live Births   0 0 0 0 0       Review of Systems   Constitutional: Negative for chills and fever.   HENT: Negative for congestion, ear pain, hearing loss and sore throat.    Eyes: Negative for pain and visual disturbance.   Respiratory: Positive for cough. Negative for shortness of breath.    Cardiovascular: Negative for chest pain, palpitations and peripheral edema.   Gastrointestinal: Positive for diarrhea. Negative for abdominal pain, constipation, heartburn, hematochezia and nausea.   Breasts:  Positive for breast mass. Negative for tenderness and discharge.   Genitourinary: Negative for dysuria, frequency, genital sores, hematuria, pelvic pain,  "urgency, vaginal bleeding and vaginal discharge.   Musculoskeletal: Negative for arthralgias, joint swelling and myalgias.   Skin: Negative for rash.   Neurological: Positive for dizziness. Negative for weakness, headaches and paresthesias.   Psychiatric/Behavioral: Negative for mood changes. The patient is not nervous/anxious.      CONSTITUTIONAL: NEGATIVE for fever, chills, change in weight  INTEGUMENTARU/SKIN: NEGATIVE for worrisome rashes, moles or lesions  EYES: NEGATIVE for vision changes or irritation  ENT: NEGATIVE for ear, mouth and throat problems  RESP: NEGATIVE for significant cough or SOB  BREAST: NEGATIVE for masses, tenderness or discharge  CV: NEGATIVE for chest pain, palpitations or peripheral edema  GI: NEGATIVE for nausea, abdominal pain, heartburn, or change in bowel habits  : NEGATIVE for unusual urinary or vaginal symptoms. Periods are regular.  MUSCULOSKELETAL: NEGATIVE for significant arthralgias or myalgia  NEURO: NEGATIVE for weakness, dizziness or paresthesias  PSYCHIATRIC: NEGATIVE for changes in mood or affect     OBJECTIVE:   /72   Pulse 65   Temp 98  F (36.7  C) (Tympanic)   Resp 14   Ht 1.626 m (5' 4\")   Wt 78.7 kg (173 lb 6.4 oz)   LMP 08/29/2022   SpO2 97%   BMI 29.76 kg/m    Physical Exam  GENERAL: healthy, alert and no distress  EYES: Eyes grossly normal to inspection, PERRL and conjunctivae and sclerae normal  HENT: ear canals and TM's normal, nose and mouth without ulcers or lesions  NECK: no adenopathy, no asymmetry, masses, or scars and thyroid normal to palpation  RESP: lungs clear to auscultation - no rales, rhonchi or wheezes  BREAST: normal without masses, tenderness or nipple discharge and no palpable axillary masses or adenopathy  CV: regular rate and rhythm, normal S1 S2, no S3 or S4, no murmur, click or rub, no peripheral edema and peripheral pulses strong  ABDOMEN: soft, nontender, no hepatosplenomegaly, no masses and bowel sounds normal  MS: no gross " musculoskeletal defects noted, no edema  SKIN: no suspicious lesions or rashes  NEURO: Normal strength and tone, mentation intact and speech normal  PSYCH: mentation appears normal, affect normal/bright    Diagnostic Test Results:  Labs reviewed in Epic    ASSESSMENT/PLAN:     Assessment and Plan  1. Routine general medical examination at a health care facility    - REVIEW OF HEALTH MAINTENANCE PROTOCOL ORDERS  - HIV Antigen Antibody Combo; Future  - Hepatitis C Screen Reflex to HCV RNA Quant and Genotype; Future  - INFLUENZA VACCINE IM > 6 MONTHS VALENT IIV4 (AFLURIA/FLUZONE)  - Comprehensive metabolic panel (BMP + Alb, Alk Phos, ALT, AST, Total. Bili, TP); Future  - CBC with platelets; Future  - TSH with free T4 reflex; Future    2. Dizziness  New problem, possible metabolic causes which we will exclude with labs and emphasized on hydration .   - Comprehensive metabolic panel (BMP + Alb, Alk Phos, ALT, AST, Total. Bili, TP); Future  - CBC with platelets; Future  - TSH with free T4 reflex; Future    3. Mild intermittent asthma without complication  - VENTOLIN  (90 Base) MCG/ACT inhaler; Inhale 1 puff into the lungs every 6 hours as needed for shortness of breath / dyspnea  Dispense: 18 g; Refill: 1    4. Migraine without status migrainosus, not intractable, unspecified migraine type  - SUMAtriptan (IMITREX) 50 MG tablet; Take 1 tablet (50 mg) by mouth at onset of headache for migraine  Dispense: 20 tablet; Refill: 3    5. Need for hepatitis C screening test  - Hepatitis C Screen Reflex to HCV RNA Quant and Genotype; Future    6. Screening for HIV (human immunodeficiency virus)  - HIV Antigen Antibody Combo; Future    7. High priority for 2019-nCoV vaccine  - COVID-19,PF,MODERNA BIVALENT 18+Yrs    8. Encounter for screening for lipid disorder  - Lipid panel reflex to direct LDL Fasting; Future         Patient Instructions   As discussed ,please do fasting labs ordered. - Fasting LAB only visit  "    ====================        Return in about 6 months (around 3/29/2023), or if symptoms worsen or fail to improve, for If symptoms persist, Follow up of last visit.    Dorys Sawyer MD  [facility]      Patient has been advised of split billing requirements and indicates understanding: Yes    COUNSELING:  Reviewed preventive health counseling, as reflected in patient instructions  Special attention given to:        Regular exercise       Healthy diet/nutrition       Vision screening       Hearing screening       Immunizations    Vaccinated for: Influenza             Alcohol Use       Contraception       Safe sex practices/STD prevention    Estimated body mass index is 29.76 kg/m  as calculated from the following:    Height as of this encounter: 1.626 m (5' 4\").    Weight as of this encounter: 78.7 kg (173 lb 6.4 oz).    Weight management plan: Discussed healthy diet and exercise guidelines    She reports that she has never smoked. She has never used smokeless tobacco.      Counseling Resources:  ATP IV Guidelines  Pooled Cohorts Equation Calculator  Breast Cancer Risk Calculator  BRCA-Related Cancer Risk Assessment: FHS-7 Tool  FRAX Risk Assessment  ICSI Preventive Guidelines  Dietary Guidelines for Americans, 2010  USDA's MyPlate  ASA Prophylaxis  Lung CA Screening    Dorys Sawyer MD  [facility]  "

## 2022-09-29 NOTE — PATIENT INSTRUCTIONS
As discussed ,please do fasting labs ordered. - Fasting LAB only visit     ====================

## 2022-10-18 ENCOUNTER — LAB (OUTPATIENT)
Dept: LAB | Facility: CLINIC | Age: 27
End: 2022-10-18
Payer: COMMERCIAL

## 2022-10-18 DIAGNOSIS — R42 DIZZINESS: ICD-10-CM

## 2022-10-18 DIAGNOSIS — Z00.00 ROUTINE GENERAL MEDICAL EXAMINATION AT A HEALTH CARE FACILITY: ICD-10-CM

## 2022-10-18 DIAGNOSIS — Z11.59 NEED FOR HEPATITIS C SCREENING TEST: ICD-10-CM

## 2022-10-18 DIAGNOSIS — Z11.4 SCREENING FOR HIV (HUMAN IMMUNODEFICIENCY VIRUS): ICD-10-CM

## 2022-10-18 DIAGNOSIS — Z13.220 ENCOUNTER FOR SCREENING FOR LIPID DISORDER: ICD-10-CM

## 2022-10-18 LAB
ALBUMIN SERPL-MCNC: 3.8 G/DL (ref 3.4–5)
ALP SERPL-CCNC: 72 U/L (ref 40–150)
ALT SERPL W P-5'-P-CCNC: 27 U/L (ref 0–50)
ANION GAP SERPL CALCULATED.3IONS-SCNC: 7 MMOL/L (ref 3–14)
AST SERPL W P-5'-P-CCNC: 14 U/L (ref 0–45)
BILIRUB SERPL-MCNC: 0.4 MG/DL (ref 0.2–1.3)
BUN SERPL-MCNC: 10 MG/DL (ref 7–30)
CALCIUM SERPL-MCNC: 8.7 MG/DL (ref 8.5–10.1)
CHLORIDE BLD-SCNC: 108 MMOL/L (ref 94–109)
CHOLEST SERPL-MCNC: 194 MG/DL
CO2 SERPL-SCNC: 25 MMOL/L (ref 20–32)
CREAT SERPL-MCNC: 0.73 MG/DL (ref 0.52–1.04)
ERYTHROCYTE [DISTWIDTH] IN BLOOD BY AUTOMATED COUNT: 12.5 % (ref 10–15)
FASTING STATUS PATIENT QL REPORTED: YES
GFR SERPL CREATININE-BSD FRML MDRD: >90 ML/MIN/1.73M2
GLUCOSE BLD-MCNC: 88 MG/DL (ref 70–99)
HCT VFR BLD AUTO: 42 % (ref 35–47)
HCV AB SERPL QL IA: NONREACTIVE
HDLC SERPL-MCNC: 48 MG/DL
HGB BLD-MCNC: 14.3 G/DL (ref 11.7–15.7)
HIV 1+2 AB+HIV1 P24 AG SERPL QL IA: NONREACTIVE
LDLC SERPL CALC-MCNC: 118 MG/DL
MCH RBC QN AUTO: 29.8 PG (ref 26.5–33)
MCHC RBC AUTO-ENTMCNC: 34 G/DL (ref 31.5–36.5)
MCV RBC AUTO: 88 FL (ref 78–100)
NONHDLC SERPL-MCNC: 146 MG/DL
PLATELET # BLD AUTO: 261 10E3/UL (ref 150–450)
POTASSIUM BLD-SCNC: 4.3 MMOL/L (ref 3.4–5.3)
PROT SERPL-MCNC: 7.4 G/DL (ref 6.8–8.8)
RBC # BLD AUTO: 4.8 10E6/UL (ref 3.8–5.2)
SODIUM SERPL-SCNC: 140 MMOL/L (ref 133–144)
TRIGL SERPL-MCNC: 142 MG/DL
TSH SERPL DL<=0.005 MIU/L-ACNC: 2.7 MU/L (ref 0.4–4)
WBC # BLD AUTO: 5.6 10E3/UL (ref 4–11)

## 2022-10-18 PROCEDURE — 80061 LIPID PANEL: CPT

## 2022-10-18 PROCEDURE — 85027 COMPLETE CBC AUTOMATED: CPT

## 2022-10-18 PROCEDURE — 87389 HIV-1 AG W/HIV-1&-2 AB AG IA: CPT

## 2022-10-18 PROCEDURE — 36415 COLL VENOUS BLD VENIPUNCTURE: CPT

## 2022-10-18 PROCEDURE — 80053 COMPREHEN METABOLIC PANEL: CPT

## 2022-10-18 PROCEDURE — 86803 HEPATITIS C AB TEST: CPT

## 2022-10-18 PROCEDURE — 84443 ASSAY THYROID STIM HORMONE: CPT

## 2022-10-19 NOTE — RESULT ENCOUNTER NOTE
Your Cholesterol borderline normal. Given your age and no cardiac risk factors , recommend dietery management of avoiding high fat foods and red meat     Please let me know if you have any questions.  Dorys Sawyer MD on 10/18/2022   [facility]

## 2023-04-12 ENCOUNTER — NURSE TRIAGE (OUTPATIENT)
Dept: NURSING | Facility: CLINIC | Age: 28
End: 2023-04-12
Payer: COMMERCIAL

## 2023-04-12 NOTE — TELEPHONE ENCOUNTER
Nurse Triage SBAR    Is this a 2nd Level Triage? NO    Situation/Background: Patient calling about not having her period in 65 days Patient has done two pregnancy tests at home that have both been negative.  Consent: not needed      Assessment:   Feels bloated and full for the last month  Pain on lower right side by hip bone- comes and goes - no pain currently  No fever     Protocol Recommended Disposition:   See in office within 2 weeks    Recommendation: Advised patient to schedule an appointment in clinic within 2 weeks. Care advice given. Patient verbalized understanding and agreed with plan. Transferred to scheduling.      Genoveva Kilpatrick RN [facility] 4/12/2023 3:48 PM    Reason for Disposition    Missed 2 or more periods in a row    Additional Information    Negative: Sounds like a life-threatening emergency to the triager    Negative: Abdominal pain is present    Negative: Sexually transmitted infection (STI) exposure and prevention, questions about    Negative: Patient sounds very sick or weak to the triager    Negative: Patient wants to be seen    Negative: Pregnant and has IUD    Negative: Pregnant and prior history of 'ectopic pregnancy' or previous tubal surgery (e.g., tubal ligation)    Negative: Pregnant and history of infertility    Negative: Age > 40 years    Negative: Weight loss > 10 pounds (22 kg)    Negative: Pregnant    Negative: Wants a pregnancy test done in the office    Protocols used: MENSTRUAL PERIOD - MISSED OR LATE-A-OH

## 2023-04-17 ENCOUNTER — OFFICE VISIT (OUTPATIENT)
Dept: FAMILY MEDICINE | Facility: CLINIC | Age: 28
End: 2023-04-17
Payer: COMMERCIAL

## 2023-04-17 VITALS
HEART RATE: 83 BPM | BODY MASS INDEX: 30.52 KG/M2 | SYSTOLIC BLOOD PRESSURE: 98 MMHG | DIASTOLIC BLOOD PRESSURE: 72 MMHG | WEIGHT: 178.8 LBS | RESPIRATION RATE: 20 BRPM | OXYGEN SATURATION: 97 % | TEMPERATURE: 97.4 F | HEIGHT: 64 IN

## 2023-04-17 DIAGNOSIS — N91.2 AMENORRHEA: ICD-10-CM

## 2023-04-17 DIAGNOSIS — N91.2 AMENORRHEA: Primary | ICD-10-CM

## 2023-04-17 DIAGNOSIS — R79.89 PROLACTIN INCREASED: Primary | ICD-10-CM

## 2023-04-17 LAB
B-HCG SERPL-ACNC: <1 IU/L (ref 0–5)
ESTRADIOL SERPL-MCNC: 77 PG/ML
FSH SERPL IRP2-ACNC: 4.9 MIU/ML
HCG UR QL: NEGATIVE
PROLACTIN SERPL 3RD IS-MCNC: 24 NG/ML (ref 5–23)
TSH SERPL DL<=0.005 MIU/L-ACNC: 2.36 MU/L (ref 0.4–4)

## 2023-04-17 PROCEDURE — 81025 URINE PREGNANCY TEST: CPT | Performed by: PHYSICIAN ASSISTANT

## 2023-04-17 PROCEDURE — 82670 ASSAY OF TOTAL ESTRADIOL: CPT | Performed by: PHYSICIAN ASSISTANT

## 2023-04-17 PROCEDURE — 83001 ASSAY OF GONADOTROPIN (FSH): CPT | Performed by: PHYSICIAN ASSISTANT

## 2023-04-17 PROCEDURE — 84146 ASSAY OF PROLACTIN: CPT | Performed by: PHYSICIAN ASSISTANT

## 2023-04-17 PROCEDURE — 84443 ASSAY THYROID STIM HORMONE: CPT | Performed by: PHYSICIAN ASSISTANT

## 2023-04-17 PROCEDURE — 84403 ASSAY OF TOTAL TESTOSTERONE: CPT | Performed by: PHYSICIAN ASSISTANT

## 2023-04-17 PROCEDURE — 99214 OFFICE O/P EST MOD 30 MIN: CPT | Performed by: PHYSICIAN ASSISTANT

## 2023-04-17 PROCEDURE — 84702 CHORIONIC GONADOTROPIN TEST: CPT | Performed by: PHYSICIAN ASSISTANT

## 2023-04-17 PROCEDURE — 36415 COLL VENOUS BLD VENIPUNCTURE: CPT | Performed by: PHYSICIAN ASSISTANT

## 2023-04-17 ASSESSMENT — ASTHMA QUESTIONNAIRES
QUESTION_2 LAST FOUR WEEKS HOW OFTEN HAVE YOU HAD SHORTNESS OF BREATH: ONCE OR TWICE A WEEK
QUESTION_4 LAST FOUR WEEKS HOW OFTEN HAVE YOU USED YOUR RESCUE INHALER OR NEBULIZER MEDICATION (SUCH AS ALBUTEROL): ONCE A WEEK OR LESS
ACT_TOTALSCORE: 23
QUESTION_5 LAST FOUR WEEKS HOW WOULD YOU RATE YOUR ASTHMA CONTROL: COMPLETELY CONTROLLED
QUESTION_1 LAST FOUR WEEKS HOW MUCH OF THE TIME DID YOUR ASTHMA KEEP YOU FROM GETTING AS MUCH DONE AT WORK, SCHOOL OR AT HOME: NONE OF THE TIME
QUESTION_3 LAST FOUR WEEKS HOW OFTEN DID YOUR ASTHMA SYMPTOMS (WHEEZING, COUGHING, SHORTNESS OF BREATH, CHEST TIGHTNESS OR PAIN) WAKE YOU UP AT NIGHT OR EARLIER THAN USUAL IN THE MORNING: NOT AT ALL
ACT_TOTALSCORE: 23

## 2023-04-17 ASSESSMENT — PAIN SCALES - GENERAL: PAINLEVEL: NO PAIN (0)

## 2023-04-17 NOTE — LETTER
My Asthma Action Plan    Name: Loraine Kim   YOB: 1995  Date: 4/17/2023   My doctor: JORDI Kaba   My clinic: [facility]        My Rescue Medicine:   Albuterol inhaler (Proair/Ventolin/Proventil HFA)  2-4 puffs EVERY 4 HOURS as needed. Use a spacer if recommended by your provider.   My Asthma Severity:   Intermittent / Exercise Induced  Know your asthma triggers:              GREEN ZONE   Good Control  I feel good  No cough or wheeze  Can work, sleep and play without asthma symptoms       Take your asthma control medicine every day.     If exercise triggers your asthma, take your rescue medication  15 minutes before exercise or sports, and  During exercise if you have asthma symptoms  Spacer to use with inhaler: If you have a spacer, make sure to use it with your inhaler             YELLOW ZONE Getting Worse  I have ANY of these:  I do not feel good  Cough or wheeze  Chest feels tight  Wake up at night   Keep taking your Green Zone medications  Start taking your rescue medicine:  every 20 minutes for up to 1 hour. Then every 4 hours for 24-48 hours.  If you stay in the Yellow Zone for more than 12-24 hours, contact your doctor.  If you do not return to the Green Zone in 12-24 hours or you get worse, start taking your oral steroid medicine if prescribed by your provider.           RED ZONE Medical Alert - Get Help  I have ANY of these:  I feel awful  Medicine is not helping  Breathing getting harder  Trouble walking or talking  Nose opens wide to breathe       Take your rescue medicine NOW  If your provider has prescribed an oral steroid medicine, start taking it NOW  Call your doctor NOW  If you are still in the Red Zone after 20 minutes and you have not reached your doctor:  Take your rescue medicine again and  Call 911 or go to the emergency room right away    See your regular doctor within 2 weeks of an Emergency Room or Urgent Care visit for follow-up treatment.           Annual Reminders:  Meet with Asthma Educator,  Flu Shot in the Fall, consider Pneumonia Vaccination for patients with asthma (aged 19 and older).    Pharmacy: [id number] [address]    Electronically signed by JORDI Kaba   Date: 04/17/23                    Asthma Triggers  How To Control Things That Make Your Asthma Worse    Triggers are things that make your asthma worse.  Look at the list below to help you find your triggers and   what you can do about them. You can help prevent asthma flare-ups by staying away from your triggers.      Trigger                                                          What you can do   Cigarette Smoke  Tobacco smoke can make asthma worse. Do not allow smoking in your home, car or around you.  Be sure no one smokes at a child s day care or school.  If you smoke, ask your health care provider for ways to help you quit.  Ask family members to quit too.  Ask your health care provider for a referral to Quit Plan to help you quit smoking, or call 2-607-415-PLAN.     Colds, Flu, Bronchitis  These are common triggers of asthma. Wash your hands often.  Don t touch your eyes, nose or mouth.  Get a flu shot every year.     Dust Mites  These are tiny bugs that live in cloth or carpet. They are too small to see. Wash sheets and blankets in hot water every week.   Encase pillows and mattress in dust mite proof covers.  Avoid having carpet if you can. If you have carpet, vacuum weekly.   Use a dust mask and HEPA vacuum.   Pollen and Outdoor Mold  Some people are allergic to trees, grass, or weed pollen, or molds. Try to keep your windows closed.  Limit time out doors when pollen count is high.   Ask you health care provider about taking medicine during allergy season.     Animal Dander  Some people are allergic to skin flakes, urine or saliva from pets with fur or feathers. Keep pets with fur or feathers out of your home.    If you  can t keep the pet outdoors, then keep the pet out of your bedroom.  Keep the bedroom door closed.  Keep pets off cloth furniture and away from stuffed toys.     Mice, Rats, and Cockroaches  Some people are allergic to the waste from these pests.   Cover food and garbage.  Clean up spills and food crumbs.  Store grease in the refrigerator.   Keep food out of the bedroom.   Indoor Mold  This can be a trigger if your home has high moisture. Fix leaking faucets, pipes, or other sources of water.   Clean moldy surfaces.  Dehumidify basement if it is damp and smelly.   Smoke, Strong Odors, and Sprays  These can reduce air quality. Stay away from strong odors and sprays, such as perfume, powder, hair spray, paints, smoke incense, paint, cleaning products, candles and new carpet.   Exercise or Sports  Some people with asthma have this trigger. Be active!  Ask your doctor about taking medicine before sports or exercise to prevent symptoms.    Warm up for 5-10 minutes before and after sports or exercise.     Other Triggers of Asthma  Cold air:  Cover your nose and mouth with a scarf.  Sometimes laughing or crying can be a trigger.  Some medicines and food can trigger asthma.

## 2023-04-17 NOTE — PATIENT INSTRUCTIONS
Avery Baron,    Thank you for allowing [facility] to manage your care.    I am unsure of the cause of your symptoms, but your exam is reassuring. We will see what our workup shows.     If you develop worsening/changing symptoms at any time, please call 911 or go to the emergency department for evaluation.    I ordered some lab work, please go to the laboratory to get your studies.    Please allow 1-2 business days for our office to contact you in regards to your laboratory/radiological studies.  If not done so, I encourage you to login into [employer] ([email or web address] to review your results as well.     If you have any questions or concerns, please feel free to call us at [phone]    Sincerely,    Ajay Chao PA-C    Did you know?      You can schedule a video visit for follow-up appointments as well as future appointments for certain conditions.  Please see the below link.     [email or web address]    If you have not already done so,  I encourage you to sign up for [employer] ([email or web address]).  This will allow you to review your results, securely communicate with a provider, and schedule virtual visits as well.

## 2023-04-17 NOTE — PROGRESS NOTES
"  Assessment & Plan   Problem List Items Addressed This Visit    None  Visit Diagnoses     Amenorrhea    -  Primary    Relevant Orders    HCG Qual, Urine ([id number])    hCG Quantitative Pregnancy    Follicle stimulating hormone    Estradiol    TSH with free T4 reflex    Prolactin    Testosterone total         Very pleasant 28-year-old female here with amenorrhea for 3 months.  She has had intermittent right lower quadrant abdominal pain, but this is been present for years.  Low suspicion for ectopic pregnancy or appendicitis.  hCG and other labs as above pending at this time to look for pregnancy, thyroid abnormalities or other sex hormone dysfunction.  She has had some weight gain and mild hirsutism over the last year to.  Ultrasound from 1.5 years ago showed no PCOS.  Follow-up with us in a month if not improving.    Complete history and physical exam as below. Afebrile with normal vital signs.    DDx and Dx discussed with and explained to the pt to their satisfaction.  All questions were answered at this time. Pt expressed understanding of and agreement with this dx, tx, and plan. No further workup warranted and standard medication warnings given. I have given the patient a list of pertinent indications for re-evaluation. Will go to the Emergency Department if symptoms worsen or new concerning symptoms arise. Patient left in no apparent distress.     Ordering of each unique test  21 minutes spent by me on the date of the encounter doing chart review, history and exam, documentation and further activities per the note     BMI:   Estimated body mass index is 30.34 kg/m  as calculated from the following:    Height as of this encounter: 1.635 m (5' 4.37\").    Weight as of this encounter: 81.1 kg (178 lb 12.8 oz).     See Patient Instructions    JORDI Kaba  [facility] AYAZ Baron is a 28 year old, presenting for the following health issues:  Menstrual Problem        4/17/2023 " "    7:54 AM   Additional Questions   Roomed by Alanis ROQUE MA   Accompanied by No one         4/17/2023     7:54 AM   Patient Reported Additional Medications   Patient reports taking the following new medications None     History of Present Illness       Reason for visit:  Missing period  Symptom onset:  More than a month  Symptoms include:  Have not had a period in 72 days  Symptom intensity:  Moderate  Symptom progression:  Staying the same  Had these symptoms before:  No    She eats 2-3 servings of fruits and vegetables daily.She consumes 1 sweetened beverage(s) daily.She exercises with enough effort to increase her heart rate 20 to 29 minutes per day.  She exercises with enough effort to increase her heart rate 6 days per week.   She is taking medications regularly.  bloating for the last month. LMP 3 months ago. At times has <5/10 RLQ pain. Minutes to an hour of pain. Last time yesterday. Had a copper IUD that was removed in the fall of 11/2021. No abdominal surgeries. No spotting or discharge. More chin hairs lately. ~10lbs weight gain per chart review. Sexually active with 1 partner.  They use condoms.    Review of Systems   Constitutional, HEENT, cardiovascular, pulmonary, gi and gu systems are negative, except as otherwise noted.      Objective    BP 98/72   Pulse 83   Temp 97.4  F (36.3  C) (Temporal)   Resp 20   Ht 1.635 m (5' 4.37\")   Wt 81.1 kg (178 lb 12.8 oz)   LMP 02/05/2023 (Exact Date)   SpO2 97%   Breastfeeding No   BMI 30.34 kg/m    Body mass index is 30.34 kg/m .  Physical Exam  Vitals and nursing note reviewed.   Constitutional:       General: She is not in acute distress.     Appearance: She is not ill-appearing or diaphoretic.   HENT:      Head: Normocephalic and atraumatic.      Mouth/Throat:      Mouth: Mucous membranes are moist.   Eyes:      Conjunctiva/sclera: Conjunctivae normal.   Cardiovascular:      Rate and Rhythm: Normal rate and regular rhythm.      Heart sounds: Normal " heart sounds. No murmur heard.     No friction rub. No gallop.   Pulmonary:      Effort: Pulmonary effort is normal. No respiratory distress.      Breath sounds: Normal breath sounds. No stridor. No wheezing, rhonchi or rales.   Abdominal:      General: Bowel sounds are normal. There is no distension.      Palpations: Abdomen is soft. There is no mass.      Tenderness: There is no abdominal tenderness. There is no right CVA tenderness, left CVA tenderness, guarding or rebound.      Hernia: No hernia is present.   Skin:     General: Skin is warm and dry.   Neurological:      General: No focal deficit present.      Mental Status: She is alert. Mental status is at baseline.   Psychiatric:         Mood and Affect: Mood normal.         Behavior: Behavior normal.      Labs pending

## 2023-04-20 LAB — TESTOST SERPL-MCNC: 58 NG/DL (ref 8–60)

## 2023-04-24 ENCOUNTER — LAB (OUTPATIENT)
Dept: LAB | Facility: CLINIC | Age: 28
End: 2023-04-24
Payer: COMMERCIAL

## 2023-04-24 DIAGNOSIS — R79.89 PROLACTIN INCREASED: ICD-10-CM

## 2023-04-24 DIAGNOSIS — N91.2 AMENORRHEA: ICD-10-CM

## 2023-04-24 LAB — PROLACTIN SERPL 3RD IS-MCNC: 21 NG/ML (ref 5–23)

## 2023-04-24 PROCEDURE — 36415 COLL VENOUS BLD VENIPUNCTURE: CPT

## 2023-04-24 PROCEDURE — 84146 ASSAY OF PROLACTIN: CPT

## 2023-12-16 ENCOUNTER — HEALTH MAINTENANCE LETTER (OUTPATIENT)
Age: 28
End: 2023-12-16

## 2024-03-26 ENCOUNTER — OFFICE VISIT (OUTPATIENT)
Dept: FAMILY MEDICINE | Facility: CLINIC | Age: 29
End: 2024-03-26
Payer: COMMERCIAL

## 2024-03-26 VITALS
TEMPERATURE: 98.6 F | HEART RATE: 64 BPM | WEIGHT: 165.6 LBS | BODY MASS INDEX: 27.59 KG/M2 | SYSTOLIC BLOOD PRESSURE: 124 MMHG | RESPIRATION RATE: 12 BRPM | DIASTOLIC BLOOD PRESSURE: 78 MMHG | OXYGEN SATURATION: 99 % | HEIGHT: 65 IN

## 2024-03-26 DIAGNOSIS — M54.50 CHRONIC BILATERAL LOW BACK PAIN WITHOUT SCIATICA: ICD-10-CM

## 2024-03-26 DIAGNOSIS — G89.29 CHRONIC BILATERAL LOW BACK PAIN WITHOUT SCIATICA: ICD-10-CM

## 2024-03-26 DIAGNOSIS — Z12.4 CERVICAL CANCER SCREENING: ICD-10-CM

## 2024-03-26 DIAGNOSIS — G43.909 MIGRAINE WITHOUT STATUS MIGRAINOSUS, NOT INTRACTABLE, UNSPECIFIED MIGRAINE TYPE: ICD-10-CM

## 2024-03-26 DIAGNOSIS — Z00.00 ROUTINE GENERAL MEDICAL EXAMINATION AT A HEALTH CARE FACILITY: Primary | ICD-10-CM

## 2024-03-26 DIAGNOSIS — J45.20 MILD INTERMITTENT ASTHMA WITHOUT COMPLICATION: ICD-10-CM

## 2024-03-26 DIAGNOSIS — N92.6 IRREGULAR PERIODS: ICD-10-CM

## 2024-03-26 DIAGNOSIS — R10.31 RLQ ABDOMINAL PAIN: ICD-10-CM

## 2024-03-26 PROCEDURE — 99214 OFFICE O/P EST MOD 30 MIN: CPT | Mod: 25

## 2024-03-26 PROCEDURE — G0145 SCR C/V CYTO,THINLAYER,RESCR: HCPCS

## 2024-03-26 PROCEDURE — 99395 PREV VISIT EST AGE 18-39: CPT

## 2024-03-26 RX ORDER — ALBUTEROL SULFATE 90 UG/1
1 AEROSOL, METERED RESPIRATORY (INHALATION) EVERY 6 HOURS PRN
Qty: 18 G | Refills: 1 | Status: SHIPPED | OUTPATIENT
Start: 2024-03-26

## 2024-03-26 RX ORDER — SUMATRIPTAN 50 MG/1
50 TABLET, FILM COATED ORAL
Qty: 20 TABLET | Refills: 3 | Status: SHIPPED | OUTPATIENT
Start: 2024-03-26

## 2024-03-26 SDOH — HEALTH STABILITY: PHYSICAL HEALTH: ON AVERAGE, HOW MANY DAYS PER WEEK DO YOU ENGAGE IN MODERATE TO STRENUOUS EXERCISE (LIKE A BRISK WALK)?: 4 DAYS

## 2024-03-26 SDOH — HEALTH STABILITY: PHYSICAL HEALTH: ON AVERAGE, HOW MANY MINUTES DO YOU ENGAGE IN EXERCISE AT THIS LEVEL?: 30 MIN

## 2024-03-26 ASSESSMENT — ASTHMA QUESTIONNAIRES
QUESTION_4 LAST FOUR WEEKS HOW OFTEN HAVE YOU USED YOUR RESCUE INHALER OR NEBULIZER MEDICATION (SUCH AS ALBUTEROL): ONCE A WEEK OR LESS
QUESTION_1 LAST FOUR WEEKS HOW MUCH OF THE TIME DID YOUR ASTHMA KEEP YOU FROM GETTING AS MUCH DONE AT WORK, SCHOOL OR AT HOME: NONE OF THE TIME
ACT_TOTALSCORE: 23
QUESTION_5 LAST FOUR WEEKS HOW WOULD YOU RATE YOUR ASTHMA CONTROL: COMPLETELY CONTROLLED
QUESTION_2 LAST FOUR WEEKS HOW OFTEN HAVE YOU HAD SHORTNESS OF BREATH: ONCE OR TWICE A WEEK
QUESTION_3 LAST FOUR WEEKS HOW OFTEN DID YOUR ASTHMA SYMPTOMS (WHEEZING, COUGHING, SHORTNESS OF BREATH, CHEST TIGHTNESS OR PAIN) WAKE YOU UP AT NIGHT OR EARLIER THAN USUAL IN THE MORNING: NOT AT ALL
ACT_TOTALSCORE: 23

## 2024-03-26 ASSESSMENT — SOCIAL DETERMINANTS OF HEALTH (SDOH): HOW OFTEN DO YOU GET TOGETHER WITH FRIENDS OR RELATIVES?: TWICE A WEEK

## 2024-03-26 NOTE — LETTER
My Asthma Action Plan    Name: Loraine Kim   YOB: 1995  Date: 3/26/2024   My doctor: GANESH White CNP   My clinic: [facility]        My Rescue Medicine:   Albuterol inhaler (Proair/Ventolin/Proventil HFA)  2-4 puffs EVERY 4 HOURS as needed. Use a spacer if recommended by your provider.   My Asthma Severity:   Intermittent / Exercise Induced  Know your asthma triggers: exercise or sports and environmental triggers             GREEN ZONE   Good Control  I feel good  No cough or wheeze  Can work, sleep and play without asthma symptoms       Take your asthma control medicine every day.     If exercise triggers your asthma, take your rescue medication  15 minutes before exercise or sports, and  During exercise if you have asthma symptoms  Spacer to use with inhaler: If you have a spacer, make sure to use it with your inhaler             YELLOW ZONE Getting Worse  I have ANY of these:  I do not feel good  Cough or wheeze  Chest feels tight  Wake up at night   Keep taking your Green Zone medications  Start taking your rescue medicine:  every 20 minutes for up to 1 hour. Then every 4 hours for 24-48 hours.  If you stay in the Yellow Zone for more than 12-24 hours, contact your doctor.  If you do not return to the Green Zone in 12-24 hours or you get worse, start taking your oral steroid medicine if prescribed by your provider.           RED ZONE Medical Alert - Get Help  I have ANY of these:  I feel awful  Medicine is not helping  Breathing getting harder  Trouble walking or talking  Nose opens wide to breathe       Take your rescue medicine NOW  If your provider has prescribed an oral steroid medicine, start taking it NOW  Call your doctor NOW  If you are still in the Red Zone after 20 minutes and you have not reached your doctor:  Take your rescue medicine again and  Call 911 or go to the emergency room right away    See your regular doctor within 2 weeks of an  Emergency Room or Urgent Care visit for follow-up treatment.          Annual Reminders:  Meet with Asthma Educator,  Flu Shot in the Fall, consider Pneumonia Vaccination for patients with asthma (aged 19 and older).    Pharmacy:    [id number] - [place], [place] - [address]  [id number] [place] [place] [facility] [address] [facility]    Electronically signed by GANESH White CNP   Date: 03/26/24                    Asthma Triggers  How To Control Things That Make Your Asthma Worse    Triggers are things that make your asthma worse.  Look at the list below to help you find your triggers and   what you can do about them. You can help prevent asthma flare-ups by staying away from your triggers.      Trigger                                                          What you can do   Cigarette Smoke  Tobacco smoke can make asthma worse. Do not allow smoking in your home, car or around you.  Be sure no one smokes at a child s day care or school.  If you smoke, ask your health care provider for ways to help you quit.  Ask family members to quit too.  Ask your health care provider for a referral to Quit Plan to help you quit smoking, or call 4-491-036-PLAN.     Colds, Flu, Bronchitis  These are common triggers of asthma. Wash your hands often.  Don t touch your eyes, nose or mouth.  Get a flu shot every year.     Dust Mites  These are tiny bugs that live in cloth or carpet. They are too small to see. Wash sheets and blankets in hot water every week.   Encase pillows and mattress in dust mite proof covers.  Avoid having carpet if you can. If you have carpet, vacuum weekly.   Use a dust mask and HEPA vacuum.   Pollen and Outdoor Mold  Some people are allergic to trees, grass, or weed pollen, or molds. Try to keep your windows closed.  Limit time out doors when pollen count is high.   Ask you health care provider about taking medicine during allergy season.     Animal Dander  Some people are  allergic to skin flakes, urine or saliva from pets with fur or feathers. Keep pets with fur or feathers out of your home.    If you can t keep the pet outdoors, then keep the pet out of your bedroom.  Keep the bedroom door closed.  Keep pets off cloth furniture and away from stuffed toys.     Mice, Rats, and Cockroaches  Some people are allergic to the waste from these pests.   Cover food and garbage.  Clean up spills and food crumbs.  Store grease in the refrigerator.   Keep food out of the bedroom.   Indoor Mold  This can be a trigger if your home has high moisture. Fix leaking faucets, pipes, or other sources of water.   Clean moldy surfaces.  Dehumidify basement if it is damp and smelly.   Smoke, Strong Odors, and Sprays  These can reduce air quality. Stay away from strong odors and sprays, such as perfume, powder, hair spray, paints, smoke incense, paint, cleaning products, candles and new carpet.   Exercise or Sports  Some people with asthma have this trigger. Be active!  Ask your doctor about taking medicine before sports or exercise to prevent symptoms.    Warm up for 5-10 minutes before and after sports or exercise.     Other Triggers of Asthma  Cold air:  Cover your nose and mouth with a scarf.  Sometimes laughing or crying can be a trigger.  Some medicines and food can trigger asthma.

## 2024-03-26 NOTE — PATIENT INSTRUCTIONS
Preventive Care Advice   This is general advice given by our system to help you stay healthy. However, your care team may have specific advice just for you. Please talk to your care team about your preventive care needs.  Nutrition  Eat 5 or more servings of fruits and vegetables each day.  Try wheat bread, brown rice and whole grain pasta (instead of white bread, rice, and pasta).  Get enough calcium and vitamin D. Check the label on foods and aim for 100% of the RDA (recommended daily allowance).  Lifestyle  Exercise at least 150 minutes each week   (30 minutes a day, 5 days a week).  Do muscle strengthening activities 2 days a week. These help control your weight and prevent disease.  No smoking.  Wear sunscreen to prevent skin cancer.  Have a dental exam and cleaning every 6 months.  Yearly exams  See your health care team every year to talk about:  Any changes in your health.  Any medicines your care team has prescribed.  Preventive care, family planning, and ways to prevent chronic diseases.  Shots (vaccines)   HPV shots (up to age 26), if you've never had them before.  Hepatitis B shots (up to age 59), if you've never had them before.  COVID-19 shot: Get this shot when it's due.  Flu shot: Get a flu shot every year.  Tetanus shot: Get a tetanus shot every 10 years.  Pneumococcal, hepatitis A, and RSV shots: Ask your care team if you need these based on your risk.  Shingles shot (for age 50 and up).  General health tests  Diabetes screening:  Starting at age 35, Get screened for diabetes at least every 3 years.  If you are younger than age 35, ask your care team if you should be screened for diabetes.  Cholesterol test: At age 39, start having a cholesterol test every 5 years, or more often if advised.  Bone density scan (DEXA): At age 50, ask your care team if you should have this scan for osteoporosis (brittle bones).  Hepatitis C: Get tested at least once in your life.  STIs (sexually transmitted  infections)  Before age 24: Ask your care team if you should be screened for STIs.  After age 24: Get screened for STIs if you're at risk. You are at risk for STIs (including HIV) if:  You are sexually active with more than one person.  You don't use condoms every time.  You or a partner was diagnosed with a sexually transmitted infection.  If you are at risk for HIV, ask about PrEP medicine to prevent HIV.  Get tested for HIV at least once in your life, whether you are at risk for HIV or not.  Cancer screening tests  Cervical cancer screening: If you have a cervix, begin getting regular cervical cancer screening tests at age 21. Most people who have regular screenings with normal results can stop after age 65. Talk about this with your provider.  Breast cancer scan (mammogram): If you've ever had breasts, begin having regular mammograms starting at age 40. This is a scan to check for breast cancer.  Colon cancer screening: It is important to start screening for colon cancer at age 45.  Have a colonoscopy test every 10 years (or more often if you're at risk) Or, ask your provider about stool tests like a FIT test every year or Cologuard test every 3 years.  To learn more about your testing options, visit: https://www.[employer]/259208.pdf.  For help making a decision, visit: https://bit.ly/io04767.  Prostate cancer screening test: If you have a prostate and are age 55 to 69, ask your provider if you would benefit from a yearly prostate cancer screening test.  Lung cancer screening: If you are a current or former smoker age 50 to 80, ask your care team if ongoing lung cancer screenings are right for you.  For informational purposes only. Not to replace the advice of your health care provider. Copyright   2023 [employer]. All rights reserved. Clinically reviewed by the [facility]. Regenes[employer] 365600 - REV 01/24.    Learning About Stress  What is stress?     Stress is your  body's response to a hard situation. Your body can have a physical, emotional, or mental response. Stress is a fact of life for most people, and it affects everyone differently. What causes stress for you may not be stressful for someone else.  A lot of things can cause stress. You may feel stress when you go on a job interview, take a test, or run a race. This kind of short-term stress is normal and even useful. It can help you if you need to work hard or react quickly. For example, stress can help you finish an important job on time.  Long-term stress is caused by ongoing stressful situations or events. Examples of long-term stress include long-term health problems, ongoing problems at work, or conflicts in your family. Long-term stress can harm your health.  How does stress affect your health?  When you are stressed, your body responds as though you are in danger. It makes hormones that speed up your heart, make you breathe faster, and give you a burst of energy. This is called the fight-or-flight stress response. If the stress is over quickly, your body goes back to normal and no harm is done.  But if stress happens too often or lasts too long, it can have bad effects. Long-term stress can make you more likely to get sick, and it can make symptoms of some diseases worse. If you tense up when you are stressed, you may develop neck, shoulder, or low back pain. Stress is linked to high blood pressure and heart disease.  Stress also harms your emotional health. It can make you dunaway, tense, or depressed. Your relationships may suffer, and you may not do well at work or school.  What can you do to manage stress?  You can try these things to help manage stress:   Do something active. Exercise or activity can help reduce stress. Walking is a great way to get started. Even everyday activities such as housecleaning or yard work can help.  Try yoga or keron chi. These techniques combine exercise and meditation. You may need  some training at first to learn them.  Do something you enjoy. For example, listen to music or go to a movie. Practice your hobby or do volunteer work.  Meditate. This can help you relax, because you are not worrying about what happened before or what may happen in the future.  Do guided imagery. Imagine yourself in any setting that helps you feel calm. You can use online videos, books, or a teacher to guide you.  Do breathing exercises. For example:  From a standing position, bend forward from the waist with your knees slightly bent. Let your arms dangle close to the floor.  Breathe in slowly and deeply as you return to a standing position. Roll up slowly and lift your head last.  Hold your breath for just a few seconds in the standing position.  Breathe out slowly and bend forward from the waist.  Let your feelings out. Talk, laugh, cry, and express anger when you need to. Talking with supportive friends or family, a counselor, or a tamanna leader about your feelings is a healthy way to relieve stress. Avoid discussing your feelings with people who make you feel worse.  Write. It may help to write about things that are bothering you. This helps you find out how much stress you feel and what is causing it. When you know this, you can find better ways to cope.  What can you do to prevent stress?  You might try some of these things to help prevent stress:  Manage your time. This helps you find time to do the things you want and need to do.  Get enough sleep. Your body recovers from the stresses of the day while you are sleeping.  Get support. Your family, friends, and community can make a difference in how you experience stress.  Limit your news feed. Avoid or limit time on social media or news that may make you feel stressed.  Do something active. Exercise or activity can help reduce stress. Walking is a great way to get started.  Where can you learn more?  Go to https://www.healthwise.net/patiented  Enter N032 in the  "search box to learn more about \"Learning About Stress.\"  Current as of: October 24, 2023               Content Version: 14.0    9868-4263 [employer].   Care instructions adapted under license by your healthcare professional. If you have questions about a medical condition or this instruction, always ask your healthcare professional. [employer] disclaims any warranty or liability for your use of this information.      Substance Use Disorder: Care Instructions  Overview     You can improve your life and health by stopping your use of alcohol or drugs. When you don't drink or use drugs, you may feel and sleep better. You may get along better with your family, friends, and coworkers. There are medicines and programs that can help with substance use disorder.  How can you care for yourself at home?  Here are some ways to help you stay sober and prevent relapse.  If you have been given medicine to help keep you sober or reduce your cravings, be sure to take it exactly as prescribed.  Talk to your doctor about programs that can help you stop using drugs or drinking alcohol.  Do not keep alcohol or drugs in your home.  Plan ahead. Think about what you'll say if other people ask you to drink or use drugs. Try not to spend time with people who drink or use drugs.  Use the time and money spent on drinking or drugs to do something that's important to you.  Preventing a relapse  Have a plan to deal with relapse. Learn to recognize changes in your thinking that lead you to drink or use drugs. Get help before you start to drink or use drugs again.  Try to stay away from situations, friends, or places that may lead you to drink or use drugs.  If you feel the need to drink alcohol or use drugs again, seek help right away. Call a trusted friend or family member. Some people get support from organizations such as Narcotics Anonymous or [employer] or from treatment facilities.  If you relapse, get help " as soon as you can. Some people make a plan with another person that outlines what they want that person to do for them if they relapse. The plan usually includes how to handle the relapse and who to notify in case of relapse.  Don't give up. Remember that a relapse doesn't mean that you have failed. Use the experience to learn the triggers that lead you to drink or use drugs. Then quit again. Recovery is a lifelong process. Many people have several relapses before they are able to quit for good.  Follow-up care is a key part of your treatment and safety. Be sure to make and go to all appointments, and call your doctor if you are having problems. It's also a good idea to know your test results and keep a list of the medicines you take.  When should you call for help?   Call 911  anytime you think you may need emergency care. For example, call if you or someone else:    Has overdosed or has withdrawal signs. Be sure to tell the emergency workers that you are or someone else is using or trying to quit using drugs. Overdose or withdrawal signs may include:  Losing consciousness.  Seizure.  Seeing or hearing things that aren't there (hallucinations).     Is thinking or talking about suicide or harming others.   Where to get help 24 hours a day, 7 days a week   If you or someone you know talks about suicide, self-harm, a mental health crisis, a substance use crisis, or any other kind of emotional distress, get help right away. You can:    Call the Suicide and Crisis Lifeline at 980.     Call 0-108-113-TALK (1-660.638.7368).     Text HOME to 132201 to access the Crisis Text Line.   Consider saving these numbers in your phone.  Go to [employer].meline.org for more information or to chat online.  Call your doctor now or seek immediate medical care if:    You are having withdrawal symptoms. These may include nausea or vomiting, sweating, shakiness, and anxiety.   Watch closely for changes in your health, and be sure to contact  "your doctor if:    You have a relapse.     You need more help or support to stop.   Where can you learn more?  Go to https://www.healthmyBarrister.net/patiented  Enter H573 in the search box to learn more about \"Substance Use Disorder: Care Instructions.\"  Current as of: November 15, 2023               Content Version: 14.0    9139-1903 [employer].   Care instructions adapted under license by your healthcare professional. If you have questions about a medical condition or this instruction, always ask your healthcare professional. Healthwise, [employer] disclaims any warranty or liability for your use of this information.      "

## 2024-03-26 NOTE — PROGRESS NOTES
"Preventive Care Visit  [facility]  GANESH White CNP, Nurse Practitioner Primary Care  Mar 26, 2024    Assessment & Plan     Routine general medical examination at a health care facility  - offered pneumonia, covid-19 and influenza vaccines, pt wishes to complete on a different day, future orders placed  - Lipid panel reflex to direct LDL Fasting  - Glucose    Irregular periods  - history of irregular cycles, lasting 40-90 days  - plans to try for pregnancy in the next few years  - will place referral to OBGYN for further workup  - Ob/Gyn  Referral    RLQ abdominal pain   - chronic, stable, dull RLQ abdominal pain, no red flags on assessment  - will complete PUS to r/o ovarian etiology w/ pt's PMHx  - discussed worrisome symptoms and when to present to UC/ER for further workup    Chronic bilateral low back pain without sciatica  - chronic, no red flag symptoms   - reviewed supportive measures: PRICE, ibuprofen/tylenol (advised to take NSAIDs with food)  - Physical Therapy  Referral    Mild intermittent asthma without complication  - stable, using rescue inhaler <1 time per month  - advised to return to clinic if noticing worsening / change in symptoms and would consider ICS-LABA  - VENTOLIN  (90 Base) MCG/ACT inhaler  Dispense: 18 g; Refill: 1    Migraine without status migrainosus, not intractable, unspecified migraine type  - stable  - SUMAtriptan (IMITREX) 50 MG tablet  Dispense: 20 tablet; Refill: 3    Cervical cancer screening  - Pap Screen reflex to HPV if ASCUS - recommend age 25 - 29    BMI  Estimated body mass index is 27.77 kg/m  as calculated from the following:    Height as of this encounter: 1.645 m (5' 4.75\").    Weight as of this encounter: 75.1 kg (165 lb 9.6 oz).   Weight management plan: healthy exercise and diet information in AVS    Counseling  Appropriate preventive services were discussed with this patient, including applicable " "screening as appropriate for fall prevention, nutrition, physical activity, Tobacco-use cessation, weight loss and cognition.  Checklist reviewing preventive services available has been given to the patient.    The uses and side effects, including black box warnings as appropriate, were discussed in detail of medications mentioned above.  All patient questions were answered.  The patient was instructed to call immediately if any side effects developed.    RTC in 1 year for next WWE, prn sooner. The patient verbalized understanding and agreement with the plan today and has no additional questions or concerns at this time.      Charlette Baron is a 28 year old, presenting for the following:  Physical        3/26/2024     1:44 PM   Additional Questions   Roomed by unique   Accompanied by self         3/26/2024     1:44 PM   Patient Reported Additional Medications   Patient reports taking the following new medications no      Health Care Directive  Patient does not have a Health Care Directive or Living Will: Discussed advance care planning with patient; information given to patient to review.    HPI    Periods - reports history of irregular menstrual cycles, lasting 40d - 90d. Reports rarely has a <30d cycle. Also noticing pain on R side of lower abdomen described as dull and achy, doesn't seem related to menstruation, constantly present since 2020. Menstruation lasts 5 days typically, bleeding is described as \"regular\", heavy day 1-2 and then light. Uses diva cup, doesn't have to change more than 3 times per day. Does have some menstrual cramping, starts day 1 of cycle and can be significant. Taking 400mg of ibuprofen every 4hours and it doesn't always help. Pain of RLQ started with copper IUD in 2020. Reports hx of ovarian cysts that occasionally would \"pop\" with the Mirena IUD she had prior to the copper IUD. Is sexually active, no pain with intercourse. No urinary symptoms. No vomiting, diarrhea, or constipation. " No fevers or chills.     Lower back pain - sometimes has lower back pain, believes she has sciatica. Reports this has been present for months. Believes it started after standing on feet a lot during the day at work. Located in hips and lower back. Does not have any shoot pain down leg, but does feel some abnormal muscle twitching. Feels tight. No urinary or bowel incontinence. No weakness or numbness. No tingling.     Asthma - using albuterol inhaler < 1 time per month. Exercise induced, but sometimes bad air quality will also trigger asthma. Feels stable on the albuterol, no SE.         3/26/2024   General Health   How would you rate your overall physical health? (!) FAIR   Feel stress (tense, anxious, or unable to sleep) To some extent   (!) STRESS CONCERN      3/26/2024   Nutrition   Three or more servings of calcium each day? Yes   Diet: Regular (no restrictions)   How many servings of fruit and vegetables per day? (!) 2-3   How many sweetened beverages each day? 0-1         3/26/2024   Exercise   Days per week of moderate/strenous exercise 4 days   Average minutes spent exercising at this level 30 min         3/26/2024   Social Factors   Frequency of gathering with friends or relatives Twice a week   Worry food won't last until get money to buy more No   Food not last or not have enough money for food? No   Do you have housing?  Yes   Are you worried about losing your housing? No   Lack of transportation? No   Unable to get utilities (heat,electricity)? No         3/26/2024   Dental   Dentist two times every year? (!) NO         3/26/2024   TB Screening   Were you born outside of the [place]? No         Today's PHQ-2 Score:       3/26/2024     1:48 PM   PHQ-2 ( 1999 [employer])   Q1: Little interest or pleasure in doing things 0   Q2: Feeling down, depressed or hopeless 0   PHQ-2 Score 0   Q1: Little interest or pleasure in doing things Not at all   Q2: Feeling down, depressed or hopeless Not at all   PHQ-2 Score 0          3/26/2024   Substance Use   Alcohol more than 3/day or more than 7/wk No   Do you use any other substances recreationally? (!) ALCOHOL    (!) CANNABIS PRODUCTS     Social History     Tobacco Use    Smoking status: Never    Smokeless tobacco: Never   Vaping Use    Vaping Use: Never used   Substance Use Topics    Alcohol use: Yes     Comment: 2 drinks on the weekend, sometimes a beer after work    Drug use: Yes     Types: Marijuana         2022   LAST FHS-7 RESULTS   1st degree relative breast or ovarian cancer No   Any relative bilateral breast cancer Unknown   Any male have breast cancer No   Any ONE woman have BOTH breast AND ovarian cancer Yes   Any woman with breast cancer before 50yrs No   2 or more relatives with breast AND/OR ovarian cancer No   2 or more relatives with breast AND/OR bowel cancer Yes     Discussed genetic counseling referral for family history of breast cancer, pt declines today. Father's aunt had colon cancer.         3/26/2024   STI Screening   New sexual partner(s) since last STI/HIV test? No         2021     1:04 PM   PAP / HPV   PAP-ABSTRACT See Scanned Document           This result is from an external source.           3/26/2024   Contraception/Family Planning   Questions about contraception or family planning No     Reviewed and updated as needed this visit by Provider   Tobacco  Allergies  Meds  Problems  Med Hx  Surg Hx  Fam Hx  Soc   Hx Sexual Activity          Past Medical History:   Diagnosis Date    Migraine with aura     Mild asthma     Ovarian cyst      Past Surgical History:   Procedure Laterality Date    BIOPSY  2009    Benign    WISDOM TOOTH EXTRACTION       OB History    Para Term  AB Living   0 0 0 0 0 0   SAB IAB Ectopic Multiple Live Births   0 0 0 0 0     Review of Systems  CONSTITUTIONAL: NEGATIVE for fever, chills, change in weight  INTEGUMENTARY/SKIN: NEGATIVE for worrisome rashes, moles or lesions  EYES: NEGATIVE for vision  "changes or irritation  ENT/MOUTH: NEGATIVE for ear, mouth and throat problems  RESP: NEGATIVE for significant cough or SOB  BREAST: NEGATIVE for masses, tenderness or discharge  CV: NEGATIVE for chest pain, palpitations or peripheral edema  GI: NEGATIVE for nausea, abdominal pain, heartburn, or change in bowel habits  : NEGATIVE for frequency, dysuria, or hematuria  MUSCULOSKELETAL: NEGATIVE for significant arthralgias or myalgia  NEURO: NEGATIVE for weakness, dizziness or paresthesias  ENDOCRINE: NEGATIVE for temperature intolerance, skin/hair changes  HEME: NEGATIVE for bleeding problems  PSYCHIATRIC: NEGATIVE for changes in mood or affect     Objective    Exam  /78 (BP Location: Left arm, Patient Position: Sitting, Cuff Size: Adult Regular)   Pulse 64   Temp 98.6  F (37  C) (Oral)   Resp 12   Ht 1.645 m (5' 4.75\")   Wt 75.1 kg (165 lb 9.6 oz)   LMP 03/21/2024 (Exact Date)   SpO2 99%   BMI 27.77 kg/m     Estimated body mass index is 27.77 kg/m  as calculated from the following:    Height as of this encounter: 1.645 m (5' 4.75\").    Weight as of this encounter: 75.1 kg (165 lb 9.6 oz).    Physical Exam  GENERAL: alert and no distress  EYES: Eyes grossly normal to inspection, PERRL and conjunctivae and sclerae normal  HENT: ear canals and TM's normal, nose and mouth without ulcers or lesions  NECK: no adenopathy, no asymmetry, masses, or scars  RESP: lungs clear to auscultation - no rales, rhonchi or wheezes  BREAST: normal without masses, tenderness or nipple discharge and no palpable axillary masses or adenopathy  CV: regular rate and rhythm, normal S1 S2, no S3 or S4, no murmur, click or rub, no peripheral edema  ABDOMEN: soft, nontender, no hepatosplenomegaly, no masses and bowel sounds normal   (female): normal female external genitalia, normal urethral meatus, normal vaginal mucosa  MS: no gross musculoskeletal defects noted, no edema. Back w/o tenderness to spine or hips. No rash or " bruising.   SKIN: no suspicious lesions or rashes  NEURO: Normal strength and tone, mentation intact and speech normal  PSYCH: mentation appears normal, affect normal/bright    Signed Electronically by: GANESH White CNP

## 2024-03-28 LAB
BKR LAB AP GYN ADEQUACY: NORMAL
BKR LAB AP GYN INTERPRETATION: NORMAL
BKR LAB AP HPV REFLEX: NORMAL
BKR LAB AP LMP: NORMAL
BKR LAB AP PREVIOUS ABNORMAL: NORMAL
PATH REPORT.COMMENTS IMP SPEC: NORMAL
PATH REPORT.COMMENTS IMP SPEC: NORMAL
PATH REPORT.RELEVANT HX SPEC: NORMAL

## 2024-04-05 ENCOUNTER — THERAPY VISIT (OUTPATIENT)
Dept: PHYSICAL THERAPY | Facility: CLINIC | Age: 29
End: 2024-04-05
Payer: COMMERCIAL

## 2024-04-05 DIAGNOSIS — M54.50 CHRONIC RIGHT-SIDED LOW BACK PAIN WITHOUT SCIATICA: Primary | ICD-10-CM

## 2024-04-05 DIAGNOSIS — G89.29 CHRONIC BILATERAL LOW BACK PAIN WITHOUT SCIATICA: ICD-10-CM

## 2024-04-05 DIAGNOSIS — M54.50 CHRONIC BILATERAL LOW BACK PAIN WITHOUT SCIATICA: ICD-10-CM

## 2024-04-05 DIAGNOSIS — G89.29 CHRONIC RIGHT-SIDED LOW BACK PAIN WITHOUT SCIATICA: Primary | ICD-10-CM

## 2024-04-05 PROCEDURE — 97110 THERAPEUTIC EXERCISES: CPT | Mod: GP | Performed by: PHYSICAL THERAPIST

## 2024-04-05 PROCEDURE — 97161 PT EVAL LOW COMPLEX 20 MIN: CPT | Mod: GP | Performed by: PHYSICAL THERAPIST

## 2024-04-05 NOTE — PROGRESS NOTES
PHYSICAL THERAPY EVALUATION  Type of Visit: Evaluation    See electronic medical record for Abuse and Falls Screening details.    Subjective       Presenting condition or subjective complaint: Mild low back pain for many years. lifting and twisting in odd directions is uncomfortable for me. Planking is usually very pain provoking for my back, it feels like my tailbone is twisted off to one side. 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     Date of onset: 03/26/24 (date of referring provider's orders)    Relevant medical history: Asthma; Migraines or headaches   Dates & types of surgery:      Prior diagnostic imaging/testing results:       Prior therapy history for the same diagnosis, illness or injury: No      Prior Level of Function  Transfers:   Ambulation:   ADL:   IADL:     Living Environment  Social support:     Type of home:     Stairs to enter the home:         Ramp:     Stairs inside the home:         Help at home:    Equipment owned:       Employment: Yes    Hobbies/Interests: hiking with my dog    Patient goals for therapy: work with less pain, lifitng boxes of soda at work    Pain assessment:      Objective   LUMBAR SPINE EVALUATION  PAIN: Pain Level at Rest: 1/10  Pain Level with Use: 6/10  Pain Quality: twinge, sharp  Pain is Exacerbated By: twisting, wrong movements, heavy lifting  Pain is Relieved By: heat and rest  Pain Progression: Worsened  INTEGUMENTARY (edema, incisions):   POSTURE:   GAIT:   Weightbearing Status:   Assistive Device(s):   Gait Deviations:   BALANCE/PROPRIOCEPTION:   WEIGHTBEARING ALIGNMENT:   NON-WEIGHTBEARING ALIGNMENT:    ROM:   (Degrees) Left AROM Left PROM  Right AROM Right PROM   Hip Flexion       Hip Extension       Hip Abduction       Hip Adduction       Hip Internal Rotation       Hip External Rotation       Knee Flexion       Knee  Extension       Lumbar Side glide     Lumbar Flexion Min loss, slight pain on endrange flexion     Lumbar Extension Full and WNL   Pain:   End feel:   PELVIC/SI SCREEN:   STRENGTH:     MYOTOMES:   DTR S:   CORD SIGNS:   DERMATOMES:   NEURAL TENSION:   FLEXIBILITY:   LUMBAR/HIP Special Tests:    PELVIS/SI SPECIAL TESTS:    Left Right Additional Notes   ASLR      Gaenslen's Test  +    Pelvic Compression  +    Pelvic Gapping  -    Sacral Thrust  -    Thigh Thrust  +      SIJ Palpatory    Standing forward bend (first/farthest superior movement, note side)    PSIS Right side PSIS inferior to Left side PSIS   ASIS Right side superior Left side inferior     FUNCTIONAL TESTS:   PALPATION:   SPINAL SEGMENTAL CONCLUSIONS:       Assessment & Plan   CLINICAL IMPRESSIONS  Medical Diagnosis: Chronic bilateral low back pain without sciatica    Treatment Diagnosis: chronic right sided low back pain without sciatica   Impression/Assessment: Patient is a 29 year old female with low back pain complaints.  The following significant findings have been identified: Pain, Decreased ROM/flexibility, Decreased joint mobility, and Decreased activity tolerance. These impairments interfere with their ability to perform self care tasks, work tasks, and recreational activities as compared to previous level of function.     Clinical Decision Making (Complexity):  Clinical Presentation: Stable/Uncomplicated  Clinical Presentation Rationale: based on medical and personal factors listed in PT evaluation  Clinical Decision Making (Complexity): Low complexity    PLAN OF CARE  Treatment Interventions:  Interventions: Manual Therapy, Neuromuscular Re-education, Therapeutic Activity, Therapeutic Exercise    Long Term Goals     PT Goal 1  Goal Identifier: Lifitng  Goal Description: the patient will be able to lift a 20# object from the floor to counter height utilizing proper lifting mechanics noting no pain  Rationale: to maximize safety and independence  with performance of ADLs and functional tasks (patient goal to lift boxes of cans at work)  Goal Progress: currently notes a pain level of 4/10 with lifting  Target Date: 05/17/24      Frequency of Treatment: 1x daily per week  Duration of Treatment: 6 weeks    Recommended Referrals to Other Professionals:   Education Assessment:        Risks and benefits of evaluation/treatment have been explained.   Patient/Family/caregiver agrees with Plan of Care.     Evaluation Time:     PT Jb Low Complexity Minutes (82820): 20       Signing Clinician: JOSE DIEHL

## 2024-04-07 PROBLEM — M54.50 CHRONIC RIGHT-SIDED LOW BACK PAIN WITHOUT SCIATICA: Status: ACTIVE | Noted: 2024-04-07

## 2024-04-07 PROBLEM — G89.29 CHRONIC RIGHT-SIDED LOW BACK PAIN WITHOUT SCIATICA: Status: ACTIVE | Noted: 2024-04-07

## 2024-04-09 ENCOUNTER — ANCILLARY PROCEDURE (OUTPATIENT)
Dept: ULTRASOUND IMAGING | Facility: CLINIC | Age: 29
End: 2024-04-09
Payer: COMMERCIAL

## 2024-04-09 DIAGNOSIS — R10.31 RLQ ABDOMINAL PAIN: ICD-10-CM

## 2024-04-09 DIAGNOSIS — N92.6 IRREGULAR PERIODS: ICD-10-CM

## 2024-04-09 PROCEDURE — 76830 TRANSVAGINAL US NON-OB: CPT | Mod: TC | Performed by: RADIOLOGY

## 2024-04-09 PROCEDURE — 76856 US EXAM PELVIC COMPLETE: CPT | Mod: TC | Performed by: RADIOLOGY

## 2024-04-12 ENCOUNTER — TELEPHONE (OUTPATIENT)
Dept: FAMILY MEDICINE | Facility: CLINIC | Age: 29
End: 2024-04-12

## 2024-04-12 NOTE — TELEPHONE ENCOUNTER
This writer attempted to contact patient on 04/12/24      Reason for call triage and left message.      If patient calls back:   Registered Nurse called. Follow Triage Call workflow    Please see 4/11 MyChart message pt is c/o worsening right lower abdominal pain and blood with stools. Patient needs to be triaged further. VIVI pt has OBGYN appointment on 4/19.    Elza Echevarria RN

## 2024-04-12 NOTE — TELEPHONE ENCOUNTER
"Patient returning a call.  Low back pain spiked after US.  \"The pain level is where it used to be.\"    Patient was seen for right side low back pain and had an US done.  Fluid was seen on US.    Patient has an OB apt on 4/19.  Does not see a point to be seen in the clinic.    She had no blood in the stool since Wednesday.    Gayle Ferro RN, BSN  [facility]    "

## 2024-04-19 ENCOUNTER — OFFICE VISIT (OUTPATIENT)
Dept: OBGYN | Facility: CLINIC | Age: 29
End: 2024-04-19
Payer: COMMERCIAL

## 2024-04-19 VITALS
HEART RATE: 53 BPM | TEMPERATURE: 98.7 F | WEIGHT: 165 LBS | OXYGEN SATURATION: 99 % | SYSTOLIC BLOOD PRESSURE: 107 MMHG | BODY MASS INDEX: 27.67 KG/M2 | DIASTOLIC BLOOD PRESSURE: 63 MMHG

## 2024-04-19 DIAGNOSIS — N92.6 IRREGULAR PERIODS: ICD-10-CM

## 2024-04-19 DIAGNOSIS — K62.5 RECTAL BLEEDING: ICD-10-CM

## 2024-04-19 DIAGNOSIS — R10.2 PELVIC PAIN IN FEMALE: Primary | ICD-10-CM

## 2024-04-19 LAB — HBA1C MFR BLD: 5 % (ref 0–5.6)

## 2024-04-19 PROCEDURE — 84146 ASSAY OF PROLACTIN: CPT

## 2024-04-19 PROCEDURE — 36415 COLL VENOUS BLD VENIPUNCTURE: CPT

## 2024-04-19 PROCEDURE — 83498 ASY HYDROXYPROGESTERONE 17-D: CPT

## 2024-04-19 PROCEDURE — 84443 ASSAY THYROID STIM HORMONE: CPT

## 2024-04-19 PROCEDURE — 82670 ASSAY OF TOTAL ESTRADIOL: CPT

## 2024-04-19 PROCEDURE — 82627 DEHYDROEPIANDROSTERONE: CPT

## 2024-04-19 PROCEDURE — 99214 OFFICE O/P EST MOD 30 MIN: CPT

## 2024-04-19 PROCEDURE — 83002 ASSAY OF GONADOTROPIN (LH): CPT

## 2024-04-19 PROCEDURE — 83001 ASSAY OF GONADOTROPIN (FSH): CPT

## 2024-04-19 PROCEDURE — 83036 HEMOGLOBIN GLYCOSYLATED A1C: CPT

## 2024-04-19 ASSESSMENT — ANXIETY QUESTIONNAIRES
GAD7 TOTAL SCORE: 3
3. WORRYING TOO MUCH ABOUT DIFFERENT THINGS: SEVERAL DAYS
7. FEELING AFRAID AS IF SOMETHING AWFUL MIGHT HAPPEN: NOT AT ALL
IF YOU CHECKED OFF ANY PROBLEMS ON THIS QUESTIONNAIRE, HOW DIFFICULT HAVE THESE PROBLEMS MADE IT FOR YOU TO DO YOUR WORK, TAKE CARE OF THINGS AT HOME, OR GET ALONG WITH OTHER PEOPLE: NOT DIFFICULT AT ALL
2. NOT BEING ABLE TO STOP OR CONTROL WORRYING: NOT AT ALL
5. BEING SO RESTLESS THAT IT IS HARD TO SIT STILL: NOT AT ALL
1. FEELING NERVOUS, ANXIOUS, OR ON EDGE: SEVERAL DAYS
6. BECOMING EASILY ANNOYED OR IRRITABLE: SEVERAL DAYS
GAD7 TOTAL SCORE: 3

## 2024-04-19 ASSESSMENT — PATIENT HEALTH QUESTIONNAIRE - PHQ9
5. POOR APPETITE OR OVEREATING: NOT AT ALL
SUM OF ALL RESPONSES TO PHQ QUESTIONS 1-9: 5

## 2024-04-19 NOTE — PROGRESS NOTES
CC: irregular periods, RLQ pain, karthik red rectal bleeding  S: Loraine is a 28 yo  here today to discuss irregular periods, RLQ pain, karthik red rectal bleeding  -periods historically irregular 40-90d infrequent <30 d cycle lasting 5-6d day 1 crampy and painful, normal flow uses diva cup   -RLQ pain started in appx  after cysts correlated with mirena IUD, did not improve when removed and switched for copper IUD, copper IUD removed and pain not resolved   -currently sexually active using condoms and coitus interruptus in conjunction with condom use  -sometimes intercourse exacerbates RLQ pain  -had TVUS to eval pain  that irritated pain, noted karthik red bleeding in stool for 2 days   -has migraine with aura, estrogen containing agents contraindicated, has tried POP, copper IUD, and mirena, all had unwanted side effects, has been offered nexplanon and depo  -other symptoms include excess body and chin hair, acne, fluctuation in weight     O:/63   Pulse 53   Temp 98.7  F (37.1  C) (Oral)   Wt 74.8 kg (165 lb)   LMP 2024 (Exact Date)   SpO2 99%   Breastfeeding No   BMI 27.67 kg/m    Past Medical History:   Diagnosis Date    Migraine with aura     Mild asthma     Ovarian cyst      Past Surgical History:   Procedure Laterality Date    BIOPSY      Benign    WISDOM TOOTH EXTRACTION       Current Outpatient Medications   Medication Sig Dispense Refill    Multiple Vitamin (MULTIVITAMIN PO)       SUMAtriptan (IMITREX) 50 MG tablet Take 1 tablet (50 mg) by mouth at onset of headache for migraine 20 tablet 3    VENTOLIN  (90 Base) MCG/ACT inhaler Inhale 1 puff into the lungs every 6 hours as needed for shortness of breath 18 g 1     No current facility-administered medications for this visit.      No Known Allergies  Alert very pleasant female NAD  ROS + for RLQ low grade pain, karthik rectal bleeding  Denies fever,dysuria, discharge  ULTRASOUND PELVIC TRANSABDOMINAL AND TRANSVAGINAL   2024 1:15  PM     CLINICAL HISTORY: Irregular periods. Right lower quadrant abdominal  pain.     TECHNIQUE: Transabdominal scans were performed. Endovaginal ultrasound  was performed to better visualize the adnexa.     COMPARISON: Ultrasound 11/18/2021.     FINDINGS:     UTERUS: 7.7 x 4.8 x 3.4 cm. Normal in size and position with no  masses.     ENDOMETRIUM: 12 mm. Small fluid at the lower uterine segment  endometrium.     RIGHT OVARY: 5.1 x 2.6 x 1.9 cm. No focal lesion.     LEFT OVARY: 3.2 x 2.3 x 2.3 cm. No focal lesion.     Small free pelvic fluid.                                                                      IMPRESSION:  1.  Small fluid at the lower uterine segment endometrium. Endometrium  is within normal limits for size.  2.  Small free pelvic fluid.  3.  No specific acute abnormality can be seen.        PALMIRA INFANTE MD     Through shared decision making with the pt pelvic exam was declined she had two normal pelvic exam with normal tvus, pelvic exams exacerbate pain    A/P:  1. Irregular periods  -discussed rotterdam criteria with oligomenorrhea, and clinical signs of hyper androgenism acne, facial hair and historical labs, likely PCOS  -discussed recc at least 4 periods per year to minimize risk of endometrial premalignant and malignant changes  -discussed pcos increases lifetime risk of htn, DM and hyperlipidemia  -PCOS can contribute to difficulty with weight loss  -pcos can also contribute to difficulty with conception due to annovulatory cycles or highly irregular cycle  -with migraine with aura, and Loraine being sensitive to other agents pop, mirena, paraguard, would recc provera if needing to induce period  -discussed agents such as metformin for decreasing insulin resistance and restoring ovulation  -discussed supplement inisotal 40:1 ratio at 4g per day for insulin resistance in pcos  -discussed cycle tracking and opk if planning conception  -discussed solutions for conception if  anovulatory  -her and her partner have future fertility goals just not right now   -can draw new labs today     - Ob/Gyn  Referral  - TSH with free T4 reflex; Future  - 17 OH progesterone; Future  - DHEA sulfate; Future  - Prolactin; Future  - Estradiol; Future  - Hemoglobin A1c; Future  - Follicle stimulating hormone; Future  - Luteinizing Hormone; Future  - TSH with free T4 reflex  - 17 OH progesterone  - DHEA sulfate  - Prolactin  - Estradiol  - Hemoglobin A1c  - Follicle stimulating hormone  - Luteinizing Hormone    2. Pelvic pain in female  -discussed normal TVUS  -normal pelvic exam  -discussed other considerations could be something such as endometriosis that could not be seen on ultrasound, but it is not recommended to have a diagnostic laparoscopy to just check as surgery has inherent risks  -pain does not correlate with menses either  -discussed the abnormal rectal bleeding- prudent to rule out non-gyn sources of pain with normal TVUS and pelvic exam, would recc apt with GI for workup and imaging as they recc  -discussed ovarian torsion warning sx severe unilateral pain seek emergent care  -discussed follow up in 3 mo, consider pelvic floor dysfunction as differential with consideration for pelvic pt referral  -recc Follow-up US/Pelvic exam in 3 mo rtc sooner for any worsening or new sx like dysuria, discharge, worsening abd pain, dysmennorrhea   - US Transvaginal Pelvic Non-OB; Future    3. Rectal bleeding  -recc full workup for non gyn causes of pain in the setting of waxing/waning RLQ pain since 2020 with new karthik rectal bleeding  - Adult GI  Referral - Consult Only; Future    Plan for 3 mo follow-up with tuvs and pelvic exam for nonspecific RLQ pain  Plan to be seen for full workup by GI for non gyn causes of pain with karthik rectal bleeding   Reviewed how to contact us for sooner needs/feliz/GANESH Huang CNP

## 2024-04-21 LAB
ESTRADIOL SERPL-MCNC: 138 PG/ML
FSH SERPL IRP2-ACNC: 2 MIU/ML
LH SERPL-ACNC: 2.5 MIU/ML
PROLACTIN SERPL 3RD IS-MCNC: 18 NG/ML (ref 5–23)
TSH SERPL DL<=0.005 MIU/L-ACNC: 2.37 UIU/ML (ref 0.3–4.2)

## 2024-04-22 LAB — DHEA-S SERPL-MCNC: 532 UG/DL (ref 35–430)

## 2024-04-23 NOTE — TELEPHONE ENCOUNTER
Diagnosis, Referred by & from: Rectal Bleeding   Appt date: 7/3/2024   NOTES STATUS DETAILS   OFFICE NOTE from referring provider Internal [facility]:  4/19/24 - OBGYN OV with Tonja Navarrete NP   OFFICE NOTE from other specialist Care Everywhere / Internal [place] [facility] [place]:  3/26/24 - PCC OV with uZhair Kam    [facility]:  5/5/22 - OBGYN OV with Rebeca Maradiaga NP    [facility]:  4/27/21 - OBGYN OV with Génesis Ruby NP   DISCHARGE SUMMARY from hospital N/A    DISCHARGE REPORT from the ER N/A    OPERATIVE REPORT N/A    MEDICATION LIST Internal    LABS N/A    DIAGNOSTIC PROCEDURES N/A    IMAGING (DISC & REPORT)      ULTRASOUND  (ENDOANAL/ENDORECTAL) Internal [facility]:  4/9/24 - US Pelvic  11/18/21 - US Pelvic

## 2024-04-25 LAB — 17OHP SERPL-MCNC: 129 NG/DL

## 2024-04-26 DIAGNOSIS — N92.6 IRREGULAR PERIODS: Primary | ICD-10-CM

## 2024-05-17 PROBLEM — M54.50 CHRONIC RIGHT-SIDED LOW BACK PAIN WITHOUT SCIATICA: Status: RESOLVED | Noted: 2024-04-07 | Resolved: 2024-05-17

## 2024-05-17 PROBLEM — G89.29 CHRONIC RIGHT-SIDED LOW BACK PAIN WITHOUT SCIATICA: Status: RESOLVED | Noted: 2024-04-07 | Resolved: 2024-05-17

## 2024-05-17 NOTE — PROGRESS NOTES
DISCHARGE  Reason for Discharge: Patient has failed to schedule further appointments.    Equipment Issued: home exercise program    Discharge Plan: Patient to continue home program.    Referring Provider:  GANESH Russell CNP       04/05/24 0500   Appointment Info   Signing clinician's name / credentials Greg Montes DPT   Total/Authorized Visits 6 per PT plan of care   Visits Used 1   Medical Diagnosis Chronic bilateral low back pain without sciatica   PT Tx Diagnosis chronic right sided low back pain without sciatica   Progress Note/Certification   Onset of illness/injury or Date of Surgery 03/26/24  (date of referring provider's orders)   Therapy Frequency 1x daily per week   Predicted Duration 6 weeks   Progress Note Completed Date 04/05/24   PT Goal 1   Goal Identifier Lifitng   Goal Description the patient will be able to lift a 20# object from the floor to counter height utilizing proper lifting mechanics noting no pain   Rationale to maximize safety and independence with performance of ADLs and functional tasks  (patient goal to lift boxes of cans at work)   Goal Progress currently notes a pain level of 4/10 with lifting   Target Date 05/17/24   Subjective Report   Subjective Report please see initial evaluation report   Treatment Interventions (PT)   Interventions Therapeutic Procedure/Exercise;Manual Therapy   Therapeutic Procedure/Exercise   Therapeutic Procedures: strength, endurance, ROM, flexibility minutes (20422) 15   Ther Proc 1 bridge with lateral glide   Ther Proc 1 - Details x10 in each direction   Skilled Intervention initiation of home exercise routine, verbal and visual cuing given for proper execution of each exercise   Patient Response/Progress less pain with planking post exercise   PTRx Ther Proc 1 Supine Alternating Knee Push with Marching   PTRx Ther Proc 1 - Details 2 rounds of 3x5s holds to correct R posterior innominate rotation   PTRx Ther Proc 2 Bridging #2A Weight  Shift   PTRx Ther Proc 2 - Details 2x10 each side   PTRx Ther Proc 3 Prone Lumbar Extension #7 (Superman)   PTRx Ther Proc 3 - Details x11, x8   PTRx Ther Proc 4 Prone Plank   PTRx Ther Proc 4 - Details x45s without pain   Manual Therapy   Manual Therapy: Mobilization, MFR, MLD, friction massage minutes (61326) 4   Manual Therapy 1 correction of R posterior innominate rotation   Manual Therapy 1 - Details 2 rounds of 3x5s holds   Skilled Intervention test retest of ASIS/PSIS height   Patient Response/Progress normalized leg length, even palpation   Eval/Assessments   PT Eval, Low Complexity Minutes (60848) 20   Plan   Home program printed PTRX   Plan for next session progress core and gluteal strengthening   Total Session Time   Timed Code Treatment Minutes 19   Total Treatment Time (sum of timed and untimed services) 39

## 2024-07-03 ENCOUNTER — OFFICE VISIT (OUTPATIENT)
Dept: SURGERY | Facility: CLINIC | Age: 29
End: 2024-07-03
Payer: COMMERCIAL

## 2024-07-03 ENCOUNTER — PRE VISIT (OUTPATIENT)
Dept: SURGERY | Facility: CLINIC | Age: 29
End: 2024-07-03

## 2024-07-03 VITALS — HEART RATE: 99 BPM | SYSTOLIC BLOOD PRESSURE: 121 MMHG | OXYGEN SATURATION: 99 % | DIASTOLIC BLOOD PRESSURE: 77 MMHG

## 2024-07-03 DIAGNOSIS — R19.7 DIARRHEA, UNSPECIFIED TYPE: ICD-10-CM

## 2024-07-03 DIAGNOSIS — K59.09 OTHER CONSTIPATION: Primary | ICD-10-CM

## 2024-07-03 DIAGNOSIS — K62.5 RECTAL BLEEDING: ICD-10-CM

## 2024-07-03 PROCEDURE — 46600 DIAGNOSTIC ANOSCOPY SPX: CPT | Performed by: NURSE PRACTITIONER

## 2024-07-03 PROCEDURE — 99203 OFFICE O/P NEW LOW 30 MIN: CPT | Mod: 25 | Performed by: NURSE PRACTITIONER

## 2024-07-03 ASSESSMENT — PAIN SCALES - GENERAL: PAINLEVEL: NO PAIN (0)

## 2024-07-03 NOTE — PATIENT INSTRUCTIONS
Start a daily fiber supplement such as Citrucel powder. Start with once a day and slowly increase up to three times a day, if needed, over the next 4-6 weeks  Drink a lot of water with this.  Follow up if bleeding returns.

## 2024-07-03 NOTE — PROGRESS NOTES
Colon and Rectal Surgery Consult Clinic Note    Date: 7/3/2024     Referring provider:  GANESH Palmer CNP  [address] [place] [address] [address]  [place] [address]     RE: Loraine Kim  : 1995  YANETH: 7/3/2024    Loraine Kim is a very pleasant 29 year old female here for rectal bleeding.    HPI:  Loraine had rectal bleeding for two days after transvaginal ultrasound. No bleeding since then. Has some RLQ pain and bloating. Stools are 2-3 times a day. With menses gets diarrhea followed by constipation. No anorectal pain with bowel movements.   No family history of IBD or colon cancer.    Physical Examination:  /77 (BP Location: Left arm, Patient Position: Sitting, Cuff Size: Adult Regular)   Pulse 99   SpO2 99%   General: alert, oriented, in no acute distress, sitting comfortably  HEENT: mucous membranes moist    Perianal external examination: Exam was chaperoned by Ferny Gonzalez, EMT-P   Perianal skin: Intact with no excoriation or lichenification.  Lesions: small scar in the anterior midline that looks like a healed fissure.  Eversion of buttocks: There was not evidence of an anal fissure. Details: N/A.  Skin tags or external hemorrhoids: None.    Digital rectal examination: Was performed.   Sphincter tone: Good.  Palpable lesions: No.  Other: None.  Bimanual examination: was not performed    Anoscopy: Was performed.   Hemorrhoids: No significant internal hemorrhoids.  Lesions: No    Assessment/Plan: 29 year old female with isolated incident rectal bleeding after transvaginal ultrasound.  No bleeding for the past 3 months.  No pain with bowel movements.  She does have intermittent constipation and diarrhea.  I recommended starting a daily fiber supplement to improve stool consistency.  However, if bleeding returns, I would like to see her back in clinic for reevaluation. Patient's questions were answered to her stated satisfaction and she is in agreement with this plan.     Medical history:  Past  Medical History:   Diagnosis Date    Migraine with aura     Mild asthma     Ovarian cyst        Surgical history:  Past Surgical History:   Procedure Laterality Date    BIOPSY  2009    Benign    WISDOM TOOTH EXTRACTION         Problem list:    Patient Active Problem List    Diagnosis Date Noted    Panic attack 09/15/2017     Priority: Medium    Acne 06/16/2009     Priority: Medium    Visual disturbance 06/16/2009     Priority: Medium    Migraine headache 04/10/2008     Priority: Medium    Benign neoplasm of skin 09/20/2006     Priority: Medium     Formatting of this note might be different from the original.  right heel nevus  Epic      Mild intermittent asthma 09/20/2006     Priority: Medium     Formatting of this note might be different from the original.  trigger is exercise         Medications:  Current Outpatient Medications   Medication Sig Dispense Refill    Multiple Vitamin (MULTIVITAMIN PO)       SUMAtriptan (IMITREX) 50 MG tablet Take 1 tablet (50 mg) by mouth at onset of headache for migraine 20 tablet 3    VENTOLIN  (90 Base) MCG/ACT inhaler Inhale 1 puff into the lungs every 6 hours as needed for shortness of breath 18 g 1       Allergies:  No Known Allergies    Family history:  Family History   Problem Relation Age of Onset    Kidney Disease Mother     Gout Father     No Known Problems Sister     No Known Problems Brother     No Known Problems Maternal Grandmother     Hyperlipidemia Maternal Grandfather     Other Cancer Maternal Grandfather         Pancreatic    Diabetes Paternal Grandmother     Breast Cancer Paternal Grandmother     Myocardial Infarction Paternal Grandfather     Hypertension Paternal Grandfather     Hypothyroidism Cousin     Hypothyroidism Maternal Aunt     Thyroid Disease Other        Social history:  Social History     Tobacco Use    Smoking status: Never    Smokeless tobacco: Never   Substance Use Topics    Alcohol use: Yes     Comment: 2 drinks on the weekend, sometimes a  beer after work    Marital status: single.    Nursing Notes:   Ferny Gonzalez, EMT  7/3/2024 12:38 PM  Signed  Chief Complaint   Patient presents with    Consult       Vitals:    07/03/24 1236   BP: 121/77   BP Location: Left arm   Patient Position: Sitting   Cuff Size: Adult Regular   Pulse: 99   SpO2: 99%       There is no height or weight on file to calculate BMI.    Ferny Gonzalez EMT-P       20 minutes spent on the date of encounter performing chart review, history and exam, documentation and further activities as noted above with an additional 2 minutes for anoscopy.     GANESH Pennington, NP-C  Colon and Rectal Surgery   [facility]    This note was created using speech recognition software and may contain unintended word substitutions.

## 2024-07-03 NOTE — LETTER
7/3/2024       RE: Loraine Kim  [address]  [facility] [address]     Dear Colleague,    Thank you for referring your patient, Loraine Kim, to the [facility] [place] [facility]. Please see a copy of my visit note below.    Colon and Rectal Surgery Consult Clinic Note    Date: 7/3/2024     Referring provider:  GANESH Palmer CNP  [address]  [place] [address]     RE: Loraine Kim  : 1995  YANETH: 7/3/2024    Loraine Kim is a very pleasant 29 year old female here for rectal bleeding.    HPI:  Loraine had rectal bleeding for two days after transvaginal ultrasound. No bleeding since then. Has some RLQ pain and bloating. Stools are 2-3 times a day. With menses gets diarrhea followed by constipation. No anorectal pain with bowel movements.   No family history of IBD or colon cancer.    Physical Examination:  /77 (BP Location: Left arm, Patient Position: Sitting, Cuff Size: Adult Regular)   Pulse 99   SpO2 99%   General: alert, oriented, in no acute distress, sitting comfortably  HEENT: mucous membranes moist    Perianal external examination: Exam was chaperoned by JONNIE Neal-P   Perianal skin: Intact with no excoriation or lichenification.  Lesions: small scar in the anterior midline that looks like a healed fissure.  Eversion of buttocks: There was not evidence of an anal fissure. Details: N/A.  Skin tags or external hemorrhoids: None.    Digital rectal examination: Was performed.   Sphincter tone: Good.  Palpable lesions: No.  Other: None.  Bimanual examination: was not performed    Anoscopy: Was performed.   Hemorrhoids: No significant internal hemorrhoids.  Lesions: No    Assessment/Plan: 29 year old female with isolated incident rectal bleeding after transvaginal ultrasound.  No bleeding for the past 3 months.  No pain with bowel movements.  She does have intermittent  constipation and diarrhea.  I recommended starting a daily fiber supplement to improve stool consistency.  However, if bleeding returns, I would like to see her back in clinic for reevaluation. Patient's questions were answered to her stated satisfaction and she is in agreement with this plan.     Medical history:  Past Medical History:   Diagnosis Date    Migraine with aura     Mild asthma     Ovarian cyst        Surgical history:  Past Surgical History:   Procedure Laterality Date    BIOPSY  2009    Benign    WISDOM TOOTH EXTRACTION         Problem list:    Patient Active Problem List    Diagnosis Date Noted    Panic attack 09/15/2017     Priority: Medium    Acne 06/16/2009     Priority: Medium    Visual disturbance 06/16/2009     Priority: Medium    Migraine headache 04/10/2008     Priority: Medium    Benign neoplasm of skin 09/20/2006     Priority: Medium     Formatting of this note might be different from the original.  right heel nevus  Epic      Mild intermittent asthma 09/20/2006     Priority: Medium     Formatting of this note might be different from the original.  trigger is exercise         Medications:  Current Outpatient Medications   Medication Sig Dispense Refill    Multiple Vitamin (MULTIVITAMIN PO)       SUMAtriptan (IMITREX) 50 MG tablet Take 1 tablet (50 mg) by mouth at onset of headache for migraine 20 tablet 3    VENTOLIN  (90 Base) MCG/ACT inhaler Inhale 1 puff into the lungs every 6 hours as needed for shortness of breath 18 g 1       Allergies:  No Known Allergies    Family history:  Family History   Problem Relation Age of Onset    Kidney Disease Mother     Gout Father     No Known Problems Sister     No Known Problems Brother     No Known Problems Maternal Grandmother     Hyperlipidemia Maternal Grandfather     Other Cancer Maternal Grandfather         Pancreatic    Diabetes Paternal Grandmother     Breast Cancer Paternal Grandmother     Myocardial Infarction Paternal Grandfather      Hypertension Paternal Grandfather     Hypothyroidism Cousin     Hypothyroidism Maternal Aunt     Thyroid Disease Other        Social history:  Social History     Tobacco Use    Smoking status: Never    Smokeless tobacco: Never   Substance Use Topics    Alcohol use: Yes     Comment: 2 drinks on the weekend, sometimes a beer after work    Marital status: single.    Nursing Notes:   Ferny Gonzalez, EMT  7/3/2024 12:38 PM  Signed  Chief Complaint   Patient presents with    Consult       Vitals:    07/03/24 1236   BP: 121/77   BP Location: Left arm   Patient Position: Sitting   Cuff Size: Adult Regular   Pulse: 99   SpO2: 99%       There is no height or weight on file to calculate BMI.    Ferny Gonzalez EMT-P       20 minutes spent on the date of encounter performing chart review, history and exam, documentation and further activities as noted above with an additional 2 minutes for anoscopy.       This note was created using speech recognition software and may contain unintended word substitutions.            Again, thank you for allowing me to participate in the care of your patient.      Sincerely,    GANESH Miner CNP

## 2024-07-03 NOTE — NURSING NOTE
Chief Complaint   Patient presents with    Consult       Vitals:    07/03/24 1236   BP: 121/77   BP Location: Left arm   Patient Position: Sitting   Cuff Size: Adult Regular   Pulse: 99   SpO2: 99%       There is no height or weight on file to calculate BMI.    Ferny Gonzalez EMT-P

## 2024-07-19 ENCOUNTER — OFFICE VISIT (OUTPATIENT)
Dept: OBGYN | Facility: CLINIC | Age: 29
End: 2024-07-19
Payer: COMMERCIAL

## 2024-07-19 ENCOUNTER — ANCILLARY PROCEDURE (OUTPATIENT)
Dept: ULTRASOUND IMAGING | Facility: CLINIC | Age: 29
End: 2024-07-19
Payer: COMMERCIAL

## 2024-07-19 VITALS
SYSTOLIC BLOOD PRESSURE: 121 MMHG | OXYGEN SATURATION: 100 % | HEART RATE: 59 BPM | DIASTOLIC BLOOD PRESSURE: 80 MMHG | BODY MASS INDEX: 27.57 KG/M2 | WEIGHT: 164.4 LBS

## 2024-07-19 DIAGNOSIS — E28.2 PCOS (POLYCYSTIC OVARIAN SYNDROME): Primary | ICD-10-CM

## 2024-07-19 DIAGNOSIS — R10.2 PELVIC PAIN IN FEMALE: ICD-10-CM

## 2024-07-19 PROCEDURE — 99214 OFFICE O/P EST MOD 30 MIN: CPT

## 2024-07-19 PROCEDURE — 76830 TRANSVAGINAL US NON-OB: CPT | Performed by: OBSTETRICS & GYNECOLOGY

## 2024-07-19 NOTE — PROGRESS NOTES
CC: US review  S: Loraine is a 30 yo  here today in Follow-up for US for non-specific pelvic pain  -has felt well since last visit  -saw colorectal 7/3 for rectal bleeding  -had normal period   -periods currently ranging 29-41 days spontaneously  -pain that she has recently felt is centered over right ovary and kind of radiates from there rates 1-210  -had her medical records reviewed and the other thing was suggested was diagnostic lap surgery and had questions about that     O:/80   Pulse 59   Wt 74.6 kg (164 lb 6.4 oz)   LMP 2024 (Exact Date)   SpO2 100%   BMI 27.57 kg/m    Past Medical History:   Diagnosis Date    Migraine with aura     estrogen containing agents CI    Mild asthma     Ovarian cyst     PCOS (polycystic ovarian syndrome)     oligomenorrhea, clinical hyperandrogenism, lab work     Past Surgical History:   Procedure Laterality Date    BIOPSY      Benign    WISDOM TOOTH EXTRACTION       Current Outpatient Medications   Medication Sig Dispense Refill    Multiple Vitamin (MULTIVITAMIN PO)       SUMAtriptan (IMITREX) 50 MG tablet Take 1 tablet (50 mg) by mouth at onset of headache for migraine 20 tablet 3    VENTOLIN  (90 Base) MCG/ACT inhaler Inhale 1 puff into the lungs every 6 hours as needed for shortness of breath 18 g 1     No current facility-administered medications for this visit.      No Known Allergies  Alert pleasant female NAD   RRR  Normal bp and pulse   Component      Latest Ref Rng 2024  1:47 PM   TSH      0.30 - 4.20 uIU/mL 2.37    17-OH Progesterone      <=630 ng/dL 129    DHEA Sulfate      35 - 430 ug/dL 532 (H)    Prolactin      5 - 23 ng/mL 18    Estradiol      pg/mL 138    Hemoglobin A1C      0.0 - 5.6 % 5.0    FSH      mIU/mL 2.0    Luteinizing Hormone      mIU/mL 2.5         US Transvaginal Pelvic Non-OB  Order #: [id number] Accession #: [id number]  Study Notes      Anna Marie Meraz on 2024  2:36 PM      Gynecological Ultrasound  Report  Pelvic U/S - Transvaginal   [facility] Obstetrics and Gynecology  Referring Provider: GANESH Palmer CNP   Sonographer:  Anna Marie Meraz RDMS  Indication: RLQ pain  LMP: 2024   Gynecological Ultrasonography:   Uterus: anteverted. Contour is smooth/regular.  Size: 7.16 x 4.71 x 3.41 cm  Endometrium: Thickness Total 10.56 mm  Findings: No obvious focal lesions  Right Ovary: 4.13 x 2.93 x 2.13 cm. There is a poorly circumscribed anechoic cyst with internal echoes noted measuring 16 x 18 x 16 mm.  Possible resolving CL  Left Ovary: 2.86 x 2.84 x 2.33 cm. Multiple small cysts on periphery  Cul de Sac Free Fluid: Mild  Technique: Transvaginal Imaging performed     Impression:      The uterus was visualized and no abnormalities were seen. The left ovary has multiple small follicles around the periphery, consistent with the appearance of polycystic ovaries. The right ovary appears to have a resolving corpus luteum with mild free fluid consistent with ovulation.   Recommend clinical correlation.     LIGIA AKERS MD       Legend:  (H) High    US:    A/P:  Loraine is a 28 yo  here today in Follow-up for US for non-specific pelvic pain  Overall US normal  Likely ovulated from right which correlates with cycle   -feeling well currently  1. PCOS (polycystic ovarian syndrome)  -discussed with oligomenorrhea, clinical signs of hyperandrogenism, elevated DHEAs meets criteria for pcos  -discussed higher risk of metabolic sx, htn, dm, endometrial hyperplasia  -recc atleast 4 menses per year-discussed provera recommended if not spontaneously meeting that  -has tried POP- had low mood, mirena- had painful ovarian cyst, paraguard IUD- pelvic pain as agents for cycle regulation  -has migraine WITH aura- estrogen containing agents contraindicated  -knows depo, nexplanon are available to her  -discussed slynd would likely be the next preferable option as she has good adherence with pills,  alt progestigen does not seem to have low mood affect but is able to be self discontinued if noticed, it has a 24 hr missed pill window  -discussed diagnostic laparoscopy can be helpful in determining gynecologic conditions such as endometriosis- gave overview of disease and discussed use of conservative med mgmt first to achieve cycle control to see if cycle control relieved pain  -discussed she does not have hallmark findings of endometriosis- and pain is not solely just within cycle  -if she felt certain she would like a diagnostic laparoscopy she would need to meet with MD BURNHAM  -discussed meds such as orlissa,myfembree and lupron could also be considered if dysmenorrhea suspected to be r/t endo  -recc completing fasting lipids, testosterone, fasting insulin and glucose  -discussed options such as diet, lifestyle mods, medications metformin, glp-1 can be helpful for insulin resistance and restoring ovulation and menses  - Glucose; Future  - Insulin level; Future    Rtc for lab only visit for fasted labs  Or   PRN anytime for returning or persistent pain  GANESH Palmer CNP

## 2024-07-23 ENCOUNTER — OFFICE VISIT (OUTPATIENT)
Dept: SURGERY | Facility: CLINIC | Age: 29
End: 2024-07-23
Payer: COMMERCIAL

## 2024-07-23 VITALS
SYSTOLIC BLOOD PRESSURE: 105 MMHG | BODY MASS INDEX: 27.16 KG/M2 | OXYGEN SATURATION: 98 % | WEIGHT: 163 LBS | HEIGHT: 65 IN | HEART RATE: 72 BPM | DIASTOLIC BLOOD PRESSURE: 73 MMHG

## 2024-07-23 DIAGNOSIS — K62.5 RECTAL BLEEDING: Primary | ICD-10-CM

## 2024-07-23 PROCEDURE — 99213 OFFICE O/P EST LOW 20 MIN: CPT | Mod: 25 | Performed by: NURSE PRACTITIONER

## 2024-07-23 PROCEDURE — 45330 DIAGNOSTIC SIGMOIDOSCOPY: CPT | Performed by: NURSE PRACTITIONER

## 2024-07-23 ASSESSMENT — PAIN SCALES - GENERAL: PAINLEVEL: NO PAIN (0)

## 2024-07-23 NOTE — PROGRESS NOTES
"Colon and Rectal Surgery Consult Clinic Note     Referring provider:  GANESH Palmer CNP  [address] [address]  [place] [address]     RE: Loraine Kim  : 1995  YANETH: 2024    Loraine Kim is a very pleasant 29 year old female here for follow up of rectal bleeding.    HPI:  Loraine had rectal bleeding for two days after transvaginal ultrasound about a month ago. She did not have any bleeding until she had another transvaginal ultrasound last week. She then had a normal, soft bowel movement and had bright red blood with wiping several times. A small amount of blood two days ago but none since then. No associated pain.   No family history of IBD or colon cancer. Has never had a colonoscopy.    Physical Examination:  /73 (BP Location: Left arm, Patient Position: Sitting, Cuff Size: Adult Regular)   Pulse 72   Ht 5' 4.75\"   Wt 163 lb   LMP 2024 (Exact Date)   SpO2 98%   BMI 27.33 kg/m    General: alert, oriented, in no acute distress, sitting comfortably  HEENT: mucous membranes moist    Perianal external examination: Exam was chaperoned by Ferny Gonzalez, EMT-P and JONNIE Mueller   Perianal skin: Intact with no excoriation or lichenification.  Lesions: small scar in the anterior midline that looks like a healed fissure.  Eversion of buttocks: There was not evidence of an anal fissure. Details: N/A.  Skin tags or external hemorrhoids: None.    Digital rectal examination: Was performed.   Sphincter tone: Good.  Palpable lesions: No.  Other: None.  Bimanual examination: was not performed    Procedure:  After discussing the risks and benefits, the patient agreed to proceed with flexible sigmoidoscopy.    A total of two fleet enema(s) were administered for a preparation.The sigmoidoscope was inserted to 40 cm and stopped due to patient discomfort.    Findings: normal mucosa without polyps, tumors or diverticula, no biopsies taken, bowel prep was adequate, procedure well tolerated " without complications.  Retroflexion: Was performed. This was normal.  The patient tolerated the procedure well.    Procedure was performed under a collaborative agreement with Dr. Rommel Canada MD, Chief of the Division of Colon and Rectal Surgery     Assessment/Plan: 29 year old female with two isolated incidents of rectal bleeding after transvaginal ultrasound. Flexible sigmoidoscopy today was normal. Will have her continue on a fiber supplement and follow up as needed if bleeding recurs.     Medical history:  Past Medical History:   Diagnosis Date    Migraine with aura     estrogen containing agents CI    Mild asthma     Ovarian cyst     PCOS (polycystic ovarian syndrome)     oligomenorrhea, clinical hyperandrogenism, lab work       Surgical history:  Past Surgical History:   Procedure Laterality Date    BIOPSY  2009    Benign    WISDOM TOOTH EXTRACTION         Problem list:    Patient Active Problem List    Diagnosis Date Noted    Panic attack 09/15/2017     Priority: Medium    Acne 06/16/2009     Priority: Medium    Visual disturbance 06/16/2009     Priority: Medium    Migraine with aura 04/10/2008     Priority: Medium    Benign neoplasm of skin 09/20/2006     Priority: Medium     Formatting of this note might be different from the original.  right heel nevus  Epic      Mild intermittent asthma 09/20/2006     Priority: Medium     Formatting of this note might be different from the original.  trigger is exercise         Medications:  Current Outpatient Medications   Medication Sig Dispense Refill    Multiple Vitamin (MULTIVITAMIN PO)       SUMAtriptan (IMITREX) 50 MG tablet Take 1 tablet (50 mg) by mouth at onset of headache for migraine 20 tablet 3    VENTOLIN  (90 Base) MCG/ACT inhaler Inhale 1 puff into the lungs every 6 hours as needed for shortness of breath 18 g 1       Allergies:  No Known Allergies    Family history:  Family History   Problem Relation Age of Onset    Kidney Disease Mother   "   Gout Father     No Known Problems Sister     No Known Problems Brother     No Known Problems Maternal Grandmother     Hyperlipidemia Maternal Grandfather     Other Cancer Maternal Grandfather         Pancreatic    Diabetes Paternal Grandmother     Breast Cancer Paternal Grandmother     Myocardial Infarction Paternal Grandfather     Hypertension Paternal Grandfather     Hypothyroidism Cousin     Hypothyroidism Maternal Aunt     Thyroid Disease Other        Social history:  Social History     Tobacco Use    Smoking status: Never    Smokeless tobacco: Never   Substance Use Topics    Alcohol use: Yes     Comment: 2 drinks on the weekend, sometimes a beer after work    Marital status: single.    Nursing Notes:   Natty Loyola  7/23/2024  3:00 PM  Signed  Chief Complaint   Patient presents with    Follow Up     Rectal bleeding       Vitals:    07/23/24 1456   BP: 105/73   BP Location: Left arm   Patient Position: Sitting   Cuff Size: Adult Regular   Pulse: 72   SpO2: 98%   Weight: 163 lb   Height: 5' 4.75\"       Body mass index is 27.33 kg/m .    JONNIE Mueller     10 minutes spent on the date of encounter performing chart review, history and exam, documentation and further activities as noted above with an additional 10 minutes for flexible sigmoidoscopy    GANESH Pennington, NP-C  Colon and Rectal Surgery   [facility]    This note was created using speech recognition software and may contain unintended word substitutions.    "

## 2024-07-23 NOTE — NURSING NOTE
"Chief Complaint   Patient presents with    Follow Up     Rectal bleeding       Vitals:    07/23/24 1456   BP: 105/73   BP Location: Left arm   Patient Position: Sitting   Cuff Size: Adult Regular   Pulse: 72   SpO2: 98%   Weight: 163 lb   Height: 5' 4.75\"       Body mass index is 27.33 kg/m .    Natty Loyola, EMT  "

## 2024-07-23 NOTE — LETTER
"2024       RE: Loraine Kim  [address]  [facility] [address]       Dear Colleague,    Thank you for referring your patient, Loraine Kim, to the [facility] COLON AND RECTAL SURGERY [facility] [place] [facility]. Please see a copy of my visit note below.    Colon and Rectal Surgery Consult Clinic Note     Referring provider:  GANESH Palmer CNP  [address]  [place] [address]     RE: Loraine Kim  : 1995  YANETH: 2024    Loraine Kim is a very pleasant 29 year old female here for follow up of rectal bleeding.    HPI:  Loraine had rectal bleeding for two days after transvaginal ultrasound about a month ago. She did not have any bleeding until she had another transvaginal ultrasound last week. She then had a normal, soft bowel movement and had bright red blood with wiping several times. A small amount of blood two days ago but none since then. No associated pain.   No family history of IBD or colon cancer. Has never had a colonoscopy.    Physical Examination:  /73 (BP Location: Left arm, Patient Position: Sitting, Cuff Size: Adult Regular)   Pulse 72   Ht 5' 4.75\"   Wt 163 lb   LMP 2024 (Exact Date)   SpO2 98%   BMI 27.33 kg/m    General: alert, oriented, in no acute distress, sitting comfortably  HEENT: mucous membranes moist    Perianal external examination: Exam was chaperoned by Ferny Gonzalez, EMT-P and JONNIE Mueller   Perianal skin: Intact with no excoriation or lichenification.  Lesions: small scar in the anterior midline that looks like a healed fissure.  Eversion of buttocks: There was not evidence of an anal fissure. Details: N/A.  Skin tags or external hemorrhoids: None.    Digital rectal examination: Was performed.   Sphincter tone: Good.  Palpable lesions: No.  Other: None.  Bimanual examination: was not performed    Procedure:  After discussing the risks and benefits, the patient " agreed to proceed with flexible sigmoidoscopy.    A total of two fleet enema(s) were administered for a preparation.The sigmoidoscope was inserted to 40 cm and stopped due to patient discomfort.    Findings: normal mucosa without polyps, tumors or diverticula, no biopsies taken, bowel prep was adequate, procedure well tolerated without complications.  Retroflexion: Was performed. This was normal.  The patient tolerated the procedure well.    Procedure was performed under a collaborative agreement with Dr. Rommel Canada MD, Chief of the Division of Colon and Rectal Surgery     Assessment/Plan: 29 year old female with two isolated incidents of rectal bleeding after transvaginal ultrasound. Flexible sigmoidoscopy today was normal. Will have her continue on a fiber supplement and follow up as needed if bleeding recurs.     Medical history:  Past Medical History:   Diagnosis Date    Migraine with aura     estrogen containing agents CI    Mild asthma     Ovarian cyst     PCOS (polycystic ovarian syndrome)     oligomenorrhea, clinical hyperandrogenism, lab work       Surgical history:  Past Surgical History:   Procedure Laterality Date    BIOPSY  2009    Benign    WISDOM TOOTH EXTRACTION         Problem list:    Patient Active Problem List    Diagnosis Date Noted    Panic attack 09/15/2017     Priority: Medium    Acne 06/16/2009     Priority: Medium    Visual disturbance 06/16/2009     Priority: Medium    Migraine with aura 04/10/2008     Priority: Medium    Benign neoplasm of skin 09/20/2006     Priority: Medium     Formatting of this note might be different from the original.  right heel nevus  Epic      Mild intermittent asthma 09/20/2006     Priority: Medium     Formatting of this note might be different from the original.  trigger is exercise         Medications:  Current Outpatient Medications   Medication Sig Dispense Refill    Multiple Vitamin (MULTIVITAMIN PO)       SUMAtriptan (IMITREX) 50 MG tablet Take 1  "tablet (50 mg) by mouth at onset of headache for migraine 20 tablet 3    VENTOLIN  (90 Base) MCG/ACT inhaler Inhale 1 puff into the lungs every 6 hours as needed for shortness of breath 18 g 1       Allergies:  No Known Allergies    Family history:  Family History   Problem Relation Age of Onset    Kidney Disease Mother     Gout Father     No Known Problems Sister     No Known Problems Brother     No Known Problems Maternal Grandmother     Hyperlipidemia Maternal Grandfather     Other Cancer Maternal Grandfather         Pancreatic    Diabetes Paternal Grandmother     Breast Cancer Paternal Grandmother     Myocardial Infarction Paternal Grandfather     Hypertension Paternal Grandfather     Hypothyroidism Cousin     Hypothyroidism Maternal Aunt     Thyroid Disease Other        Social history:  Social History     Tobacco Use    Smoking status: Never    Smokeless tobacco: Never   Substance Use Topics    Alcohol use: Yes     Comment: 2 drinks on the weekend, sometimes a beer after work    Marital status: single.    Nursing Notes:   Natty Loyola  7/23/2024  3:00 PM  Signed  Chief Complaint   Patient presents with    Follow Up     Rectal bleeding     Vitals:    07/23/24 1456   BP: 105/73   BP Location: Left arm   Patient Position: Sitting   Cuff Size: Adult Regular   Pulse: 72   SpO2: 98%   Weight: 163 lb   Height: 5' 4.75\"     Body mass index is 27.33 kg/m .    JONNIE Mueller     10 minutes spent on the date of encounter performing chart review, history and exam, documentation and further activities as noted above with an additional 10 minutes for flexible sigmoidoscopy    This note was created using speech recognition software and may contain unintended word substitutions.      Again, thank you for allowing me to participate in the care of your patient.      Sincerely,    GANESH Miner CNP    "

## 2024-07-25 ENCOUNTER — LAB (OUTPATIENT)
Dept: LAB | Facility: CLINIC | Age: 29
End: 2024-07-25
Payer: COMMERCIAL

## 2024-07-25 DIAGNOSIS — E28.2 PCOS (POLYCYSTIC OVARIAN SYNDROME): ICD-10-CM

## 2024-07-25 DIAGNOSIS — N92.6 IRREGULAR PERIODS: ICD-10-CM

## 2024-07-25 DIAGNOSIS — Z00.00 ROUTINE GENERAL MEDICAL EXAMINATION AT A HEALTH CARE FACILITY: ICD-10-CM

## 2024-07-25 LAB
CHOLEST SERPL-MCNC: 194 MG/DL
FASTING STATUS PATIENT QL REPORTED: YES
FASTING STATUS PATIENT QL REPORTED: YES
GLUCOSE SERPL-MCNC: 86 MG/DL (ref 70–99)
HDLC SERPL-MCNC: 48 MG/DL
INSULIN SERPL-ACNC: 10.5 UU/ML (ref 2.6–24.9)
LDLC SERPL CALC-MCNC: 126 MG/DL
NONHDLC SERPL-MCNC: 146 MG/DL
TRIGL SERPL-MCNC: 98 MG/DL

## 2024-07-25 PROCEDURE — 82947 ASSAY GLUCOSE BLOOD QUANT: CPT

## 2024-07-25 PROCEDURE — 80061 LIPID PANEL: CPT

## 2024-07-25 PROCEDURE — 36415 COLL VENOUS BLD VENIPUNCTURE: CPT

## 2024-07-25 PROCEDURE — 84403 ASSAY OF TOTAL TESTOSTERONE: CPT

## 2024-07-25 PROCEDURE — 83525 ASSAY OF INSULIN: CPT

## 2024-07-28 LAB — TESTOST SERPL-MCNC: 34 NG/DL (ref 8–60)

## 2025-03-26 ENCOUNTER — OFFICE VISIT (OUTPATIENT)
Dept: FAMILY MEDICINE | Facility: CLINIC | Age: 30
End: 2025-03-26
Payer: COMMERCIAL

## 2025-03-26 VITALS
WEIGHT: 172.25 LBS | SYSTOLIC BLOOD PRESSURE: 125 MMHG | HEART RATE: 75 BPM | OXYGEN SATURATION: 98 % | TEMPERATURE: 97.3 F | BODY MASS INDEX: 28.7 KG/M2 | HEIGHT: 65 IN | DIASTOLIC BLOOD PRESSURE: 65 MMHG

## 2025-03-26 DIAGNOSIS — R10.2 PELVIC PAIN IN FEMALE: ICD-10-CM

## 2025-03-26 DIAGNOSIS — G43.109 MIGRAINE WITH AURA AND WITHOUT STATUS MIGRAINOSUS, NOT INTRACTABLE: ICD-10-CM

## 2025-03-26 DIAGNOSIS — Z00.00 ROUTINE GENERAL MEDICAL EXAMINATION AT A HEALTH CARE FACILITY: Primary | ICD-10-CM

## 2025-03-26 DIAGNOSIS — J45.20 MILD INTERMITTENT ASTHMA WITHOUT COMPLICATION: ICD-10-CM

## 2025-03-26 LAB
ANION GAP SERPL CALCULATED.3IONS-SCNC: 10 MMOL/L (ref 7–15)
BUN SERPL-MCNC: 10.9 MG/DL (ref 6–20)
CALCIUM SERPL-MCNC: 9.7 MG/DL (ref 8.8–10.4)
CHLORIDE SERPL-SCNC: 103 MMOL/L (ref 98–107)
CHOLEST SERPL-MCNC: 216 MG/DL
CREAT SERPL-MCNC: 0.72 MG/DL (ref 0.51–0.95)
EGFRCR SERPLBLD CKD-EPI 2021: >90 ML/MIN/1.73M2
EST. AVERAGE GLUCOSE BLD GHB EST-MCNC: 97 MG/DL
FASTING STATUS PATIENT QL REPORTED: NO
FASTING STATUS PATIENT QL REPORTED: NO
GLUCOSE SERPL-MCNC: 84 MG/DL (ref 70–99)
HBA1C MFR BLD: 5 % (ref 0–5.6)
HCO3 SERPL-SCNC: 25 MMOL/L (ref 22–29)
HDLC SERPL-MCNC: 57 MG/DL
LDLC SERPL CALC-MCNC: 128 MG/DL
NONHDLC SERPL-MCNC: 159 MG/DL
POTASSIUM SERPL-SCNC: 4.6 MMOL/L (ref 3.4–5.3)
SODIUM SERPL-SCNC: 138 MMOL/L (ref 135–145)
TRIGL SERPL-MCNC: 153 MG/DL

## 2025-03-26 PROCEDURE — 3078F DIAST BP <80 MM HG: CPT

## 2025-03-26 PROCEDURE — 83036 HEMOGLOBIN GLYCOSYLATED A1C: CPT

## 2025-03-26 PROCEDURE — 3074F SYST BP LT 130 MM HG: CPT

## 2025-03-26 PROCEDURE — 80048 BASIC METABOLIC PNL TOTAL CA: CPT

## 2025-03-26 PROCEDURE — 1125F AMNT PAIN NOTED PAIN PRSNT: CPT

## 2025-03-26 PROCEDURE — 99213 OFFICE O/P EST LOW 20 MIN: CPT | Mod: 25

## 2025-03-26 PROCEDURE — 99395 PREV VISIT EST AGE 18-39: CPT

## 2025-03-26 PROCEDURE — 80061 LIPID PANEL: CPT

## 2025-03-26 PROCEDURE — 36415 COLL VENOUS BLD VENIPUNCTURE: CPT

## 2025-03-26 RX ORDER — SUMATRIPTAN 50 MG/1
50 TABLET, FILM COATED ORAL
Qty: 20 TABLET | Refills: 3 | Status: SHIPPED | OUTPATIENT
Start: 2025-03-26

## 2025-03-26 RX ORDER — ALBUTEROL SULFATE 90 UG/1
1 AEROSOL, METERED RESPIRATORY (INHALATION) EVERY 6 HOURS PRN
Qty: 18 G | Refills: 1 | Status: SHIPPED | OUTPATIENT
Start: 2025-03-26

## 2025-03-26 RX ORDER — ONDANSETRON 4 MG/1
4 TABLET, ORALLY DISINTEGRATING ORAL EVERY 8 HOURS PRN
Qty: 30 TABLET | Refills: 3 | Status: SHIPPED | OUTPATIENT
Start: 2025-03-26

## 2025-03-26 SDOH — HEALTH STABILITY: PHYSICAL HEALTH: ON AVERAGE, HOW MANY DAYS PER WEEK DO YOU ENGAGE IN MODERATE TO STRENUOUS EXERCISE (LIKE A BRISK WALK)?: 3 DAYS

## 2025-03-26 ASSESSMENT — ASTHMA QUESTIONNAIRES
QUESTION_1 LAST FOUR WEEKS HOW MUCH OF THE TIME DID YOUR ASTHMA KEEP YOU FROM GETTING AS MUCH DONE AT WORK, SCHOOL OR AT HOME: A LITTLE OF THE TIME
QUESTION_3 LAST FOUR WEEKS HOW OFTEN DID YOUR ASTHMA SYMPTOMS (WHEEZING, COUGHING, SHORTNESS OF BREATH, CHEST TIGHTNESS OR PAIN) WAKE YOU UP AT NIGHT OR EARLIER THAN USUAL IN THE MORNING: NOT AT ALL
QUESTION_2 LAST FOUR WEEKS HOW OFTEN HAVE YOU HAD SHORTNESS OF BREATH: ONCE OR TWICE A WEEK
QUESTION_4 LAST FOUR WEEKS HOW OFTEN HAVE YOU USED YOUR RESCUE INHALER OR NEBULIZER MEDICATION (SUCH AS ALBUTEROL): ONCE A WEEK OR LESS
QUESTION_5 LAST FOUR WEEKS HOW WOULD YOU RATE YOUR ASTHMA CONTROL: COMPLETELY CONTROLLED
ACT_TOTALSCORE: 22

## 2025-03-26 ASSESSMENT — SOCIAL DETERMINANTS OF HEALTH (SDOH): HOW OFTEN DO YOU GET TOGETHER WITH FRIENDS OR RELATIVES?: THREE TIMES A WEEK

## 2025-03-26 ASSESSMENT — PAIN SCALES - GENERAL: PAINLEVEL_OUTOF10: MILD PAIN (1)

## 2025-03-26 NOTE — PROGRESS NOTES
"Preventive Care Visit  [facility]  GANESH Guerra CNP, Family Medicine  Mar 26, 2025    Assessment & Plan     Routine general medical examination at a health care facility  ***  - Ulipristal Acetate (DEACON) 30 MG tablet  Dispense: 1 tablet; Refill: 4  - Lipid panel reflex to direct LDL Non-fasting  - Hemoglobin A1c  - Basic metabolic panel  (Ca, Cl, CO2, Creat, Gluc, K, Na, BUN)    Mild intermittent asthma without complication  ***  - VENTOLIN  (90 Base) MCG/ACT inhaler  Dispense: 18 g; Refill: 1    Migraine with aura and without status migrainosus, not intractable  ***  - SUMAtriptan (IMITREX) 50 MG tablet  Dispense: 20 tablet; Refill: 3  - ondansetron (ZOFRAN ODT) 4 MG ODT tab  Dispense: 30 tablet; Refill: 3    Pelvic pain in female  ***  - Ob/Gyn  Referral    Due for annual physical in one year, otherwise follow up on as-needed basis.    BMI  Estimated body mass index is 29.05 kg/m  as calculated from the following:    Height as of this encounter: 1.64 m (5' 4.57\").    Weight as of this encounter: 78.1 kg (172 lb 4 oz).   {Weight Management Plan needed for ACO:572230}    Counseling  Appropriate preventive services were addressed with this patient via screening, questionnaire, or discussion as appropriate for fall prevention, nutrition, physical activity, Tobacco-use cessation, social engagement, weight loss and cognition.  Checklist reviewing preventive services available has been given to the patient.  Reviewed patient's diet, addressing concerns and/or questions.   She is at risk for lack of exercise and has been provided with information to increase physical activity for the benefit of her well-being.     {FOLLOW UP PLANS (Optional) Includes COVID19 Treatment Plan:190826}    Charlette Baron is a 29 year old, presenting for the following:  Physical and Referral (Requesting referral to endo)        3/26/2025     2:26 PM   Additional Questions   Roomed by " Gema      HPI  ***     Advance Care Planning  Patient does not have a Health Care Directive: {ADVANCE_DIRECTIVE_STATUS:465786}      3/26/2025   General Health   How would you rate your overall physical health? Good   Feel stress (tense, anxious, or unable to sleep) Only a little   (!) STRESS CONCERN      3/26/2025   Nutrition   Three or more servings of calcium each day? Yes   Diet: Regular (no restrictions)   How many servings of fruit and vegetables per day? (!) 2-3   How many sweetened beverages each day? 0-1         3/26/2025   Exercise   Days per week of moderate/strenous exercise 3 days         3/26/2025   Social Factors   Frequency of gathering with friends or relatives Three times a week   Worry food won't last until get money to buy more No   Food not last or not have enough money for food? No   Do you have housing? (Housing is defined as stable permanent housing and does not include staying ouside in a car, in a tent, in an abandoned building, in an overnight shelter, or couch-surfing.) Yes   Are you worried about losing your housing? No   Lack of transportation? No   Unable to get utilities (heat,electricity)? Yes   Want help with housing or utility concern? No   (!) FINANCIAL RESOURCE STRAIN CONCERN      3/26/2025   Dental   Dentist two times every year? Yes         3/26/2024   TB Screening   Were you born outside of the [place]? No     Today's PHQ-2 Score:       3/26/2025     2:13 PM   PHQ-2 ( 1999 [employer])   Q1: Little interest or pleasure in doing things 0   Q2: Feeling down, depressed or hopeless 0   PHQ-2 Score 0    Q1: Little interest or pleasure in doing things Not at all   Q2: Feeling down, depressed or hopeless Not at all   PHQ-2 Score 0       Patient-reported         3/26/2025   Substance Use   Alcohol more than 3/day or more than 7/wk No   Do you use any other substances recreationally? (!) CANNABIS PRODUCTS     Social History     Tobacco Use    Smoking status: Never    Smokeless tobacco: Never  "  Vaping Use    Vaping status: Never Used   Substance Use Topics    Alcohol use: Yes     Comment: 2 drinks on the weekend, sometimes a beer after work    Drug use: Yes     Types: Marijuana         9/26/2022   LAST FHS-7 RESULTS   1st degree relative breast or ovarian cancer No   Any relative bilateral breast cancer Unknown   Any male have breast cancer No   Any ONE woman have BOTH breast AND ovarian cancer Yes   Any woman with breast cancer before 50yrs No   2 or more relatives with breast AND/OR ovarian cancer No   2 or more relatives with breast AND/OR bowel cancer Yes     Mammogram Screening - Patient under 40 years of age: Routine Mammogram Screening not recommended.         3/26/2025   STI Screening   New sexual partner(s) since last STI/HIV test? No     History of abnormal Pap smear: No - age 21-29 PAP every 3 years recommended        3/26/2024     2:30 PM 4/27/2021     1:04 PM   PAP / HPV   PAP Negative for Intraepithelial Lesion or Malignancy (NILM)     PAP-ABSTRACT  See Scanned Document           This result is from an external source.         3/26/2025   Contraception/Family Planning   Questions about contraception or family planning No     Reviewed and updated as needed this visit by Provider   Tobacco   Meds  Problems  Med Hx  Surg Hx  Fam Hx  Soc Hx Sexual   Activity        Review of Systems  Constitutional, HEENT, cardiovascular, pulmonary, gi and gu systems are negative, except as otherwise noted.     Objective    Exam  /65 (BP Location: Left arm, Patient Position: Sitting, Cuff Size: Adult Regular)   Pulse 75   Temp 97.3  F (36.3  C) (Temporal)   Ht 1.64 m (5' 4.57\")   Wt 78.1 kg (172 lb 4 oz)   LMP 03/01/2025 (Exact Date)   SpO2 98%   BMI 29.05 kg/m     Estimated body mass index is 29.05 kg/m  as calculated from the following:    Height as of this encounter: 1.64 m (5' 4.57\").    Weight as of this encounter: 78.1 kg (172 lb 4 oz).    Physical Exam  {FEMALE - complete " :701192}    Signed Electronically by: GANESH Guerra CNP     actual/standing

## 2025-03-26 NOTE — PATIENT INSTRUCTIONS
At [facility], we strive to deliver an exceptional experience to you, every time we see you. If you receive a survey, please let us know what we are doing well and/or what we could improve upon, as we do value your feedback.  If you have MyChart, you can expect to receive results automatically within 24 hours of their completion.  Your provider will send a note interpreting your results as well.   If you do not have MyChart, you should receive your results in about a week by mail.    Your care team:                            Family Medicine Internal Medicine   MD Power Urrutia, MD Ioana Diaz, MD Chintan Massey, MD Deidre Cameron, PAKaranC    Van Salvador, MD Pediatrics   Lashanda Pugh, MD Carole Barbosa, MD Elza Valladares, APRN CNP Esther Galloway APRN CNP   Yanira Sanchez, MD Shawnee Ziegler, MD Brenda Morrison, CNP     Zuhair Kam, CNP Same-Day Provider (No follow-up visits)   GANESH Fernandez, DNP Jasmin Isaac, PA-C   GANESH Alvarez, FNP, BC JENN DahlC     Clinic hours: Monday - Thursday 7 am-6 pm; Fridays 7 am-5 pm.   Urgent care: Monday - Friday 10 am- 8 pm; Saturday and Sunday 9 am-5 pm.    Clinic: [phone]       [facility]: Monday - Thursday 8 am - 7 pm; Friday 8 am - 6 pm  [facility]: [phone]     Patient Education   Preventive Care Advice   This is general advice given by our system to help you stay healthy. However, your care team may have specific advice just for you. Please talk to your care team about your preventive care needs.  Nutrition  Eat 5 or more servings of fruits and vegetables each day.  Try wheat bread, brown rice and whole grain pasta (instead of white bread, rice, and pasta).  Get enough calcium and vitamin D. Check the label on foods and aim for 100% of the RDA (recommended daily allowance).  Lifestyle  Exercise at least 150  minutes each week  (30 minutes a day, 5 days a week).  Do muscle strengthening activities 2 days a week. These help control your weight and prevent disease.  No smoking.  Wear sunscreen to prevent skin cancer.  Have a dental exam and cleaning every 6 months.  Yearly exams  See your health care team every year to talk about:  Any changes in your health.  Any medicines your care team has prescribed.  Preventive care, family planning, and ways to prevent chronic diseases.  Shots (vaccines)   HPV shots (up to age 26), if you've never had them before.  Hepatitis B shots (up to age 59), if you've never had them before.  COVID-19 shot: Get this shot when it's due.  Flu shot: Get a flu shot every year.  Tetanus shot: Get a tetanus shot every 10 years.  Pneumococcal, hepatitis A, and RSV shots: Ask your care team if you need these based on your risk.  Shingles shot (for age 50 and up)  General health tests  Diabetes screening:  Starting at age 35, Get screened for diabetes at least every 3 years.  If you are younger than age 35, ask your care team if you should be screened for diabetes.  Cholesterol test: At age 39, start having a cholesterol test every 5 years, or more often if advised.  Bone density scan (DEXA): At age 50, ask your care team if you should have this scan for osteoporosis (brittle bones).  Hepatitis C: Get tested at least once in your life.  STIs (sexually transmitted infections)  Before age 24: Ask your care team if you should be screened for STIs.  After age 24: Get screened for STIs if you're at risk. You are at risk for STIs (including HIV) if:  You are sexually active with more than one person.  You don't use condoms every time.  You or a partner was diagnosed with a sexually transmitted infection.  If you are at risk for HIV, ask about PrEP medicine to prevent HIV.  Get tested for HIV at least once in your life, whether you are at risk for HIV or not.  Cancer screening tests  Cervical cancer screening:  If you have a cervix, begin getting regular cervical cancer screening tests starting at age 21.  Breast cancer scan (mammogram): If you've ever had breasts, begin having regular mammograms starting at age 40. This is a scan to check for breast cancer.  Colon cancer screening: It is important to start screening for colon cancer at age 45.  Have a colonoscopy test every 10 years (or more often if you're at risk) Or, ask your provider about stool tests like a FIT test every year or Cologuard test every 3 years.  To learn more about your testing options, visit:   .  For help making a decision, visit:   https://bit.ly/lm12766.  Prostate cancer screening test: If you have a prostate, ask your care team if a prostate cancer screening test (PSA) at age 55 is right for you.  Lung cancer screening: If you are a current or former smoker ages 50 to 80, ask your care team if ongoing lung cancer screenings are right for you.  For informational purposes only. Not to replace the advice of your health care provider. Copyright   2023 [facility] [employer]. All rights reserved. Clinically reviewed by the [facility]. [employer] 111548 - REV 01/24.  Substance Use Disorder: Care Instructions  Overview     You can improve your life and health by stopping your use of alcohol or drugs. When you don't drink or use drugs, you may feel and sleep better. You may get along better with your family, friends, and coworkers. There are medicines and programs that can help with substance use disorder.  How can you care for yourself at home?  Here are some ways to help you stay sober and prevent relapse.  If you have been given medicine to help keep you sober or reduce your cravings, be sure to take it exactly as prescribed.  Talk to your doctor about programs that can help you stop using drugs or drinking alcohol.  Do not keep alcohol or drugs in your home.  Plan ahead. Think about what you'll say if other people ask you  to drink or use drugs. Try not to spend time with people who drink or use drugs.  Use the time and money spent on drinking or drugs to do something that's important to you.  Preventing a relapse  Have a plan to deal with relapse. Learn to recognize changes in your thinking that lead you to drink or use drugs. Get help before you start to drink or use drugs again.  Try to stay away from situations, friends, or places that may lead you to drink or use drugs.  If you feel the need to drink alcohol or use drugs again, seek help right away. Call a trusted friend or family member. Some people get support from organizations such as Narcotics Anonymous or [employer] or from treatment facilities.  If you relapse, get help as soon as you can. Some people make a plan with another person that outlines what they want that person to do for them if they relapse. The plan usually includes how to handle the relapse and who to notify in case of relapse.  Don't give up. Remember that a relapse doesn't mean that you have failed. Use the experience to learn the triggers that lead you to drink or use drugs. Then quit again. Recovery is a lifelong process. Many people have several relapses before they are able to quit for good.  Follow-up care is a key part of your treatment and safety. Be sure to make and go to all appointments, and call your doctor if you are having problems. It's also a good idea to know your test results and keep a list of the medicines you take.  When should you call for help?   Call 911  anytime you think you may need emergency care. For example, call if you or someone else:    Has overdosed or has withdrawal signs. Be sure to tell the emergency workers that you are or someone else is using or trying to quit using drugs. Overdose or withdrawal signs may include:  Losing consciousness.  Seizure.  Seeing or hearing things that aren't there (hallucinations).     Is thinking or talking about suicide or harming  "others.   Where to get help 24 hours a day, 7 days a week   If you or someone you know talks about suicide, self-harm, a mental health crisis, a substance use crisis, or any other kind of emotional distress, get help right away. You can:    Call the Suicide and Crisis Lifeline at 988.     Call 8-980-805-TALK (1-303.301.1211).     Text HOME to 425155 to access the Crisis Text Line.   Consider saving these numbers in your phone.  Go to [employer] for more information or to chat online.  Call your doctor now or seek immediate medical care if:    You are having withdrawal symptoms. These may include nausea or vomiting, sweating, shakiness, and anxiety.   Watch closely for changes in your health, and be sure to contact your doctor if:    You have a relapse.     You need more help or support to stop.   Where can you learn more?  Go to https://www.anywayanyday.net/patiented  Enter H573 in the search box to learn more about \"Substance Use Disorder: Care Instructions.\"  Current as of: August 20, 2024  Content Version: 14.4    1702-8712 [employer].   Care instructions adapted under license by your healthcare professional. If you have questions about a medical condition or this instruction, always ask your healthcare professional. [employer] disclaims any warranty or liability for your use of this information.       "

## 2025-07-09 ENCOUNTER — TELEPHONE (OUTPATIENT)
Dept: FAMILY MEDICINE | Facility: CLINIC | Age: 30
End: 2025-07-09
Payer: COMMERCIAL
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
## 2025-07-14 ENCOUNTER — TELEPHONE (OUTPATIENT)
Dept: FAMILY MEDICINE | Facility: CLINIC | Age: 30
End: 2025-07-14
Payer: COMMERCIAL

## 2025-07-14 NOTE — TELEPHONE ENCOUNTER
Reason for Call:  Appointment Request    Patient requesting this type of appt: Pre-op    Requested provider: anybody at [facility] surgery 07/23/2025    Reason patient unable to be scheduled: Not within requested timeframe    When does patient want to be seen/preferred time: 3-7 days    Comments:  n/a    Could we send this information to you in [facility] or would you prefer to receive a phone call?:   Patient would prefer a phone call   Okay to leave a detailed message?: Yes at Cell number on file:    Telephone Information:   Mobile [phone]       Call taken on 7/14/2025 at 5:15 PM by ROD CONTEH

## 2025-07-14 NOTE — TELEPHONE ENCOUNTER
Called and scheduled a pre op physical for 7/17/2025. (No other provider available before surgery date)  Carmenza Tirado MA/  [facility]   Primary Care

## 2025-07-16 ENCOUNTER — OFFICE VISIT (OUTPATIENT)
Dept: URGENT CARE | Facility: URGENT CARE | Age: 30
End: 2025-07-16
Payer: COMMERCIAL

## 2025-07-16 VITALS
OXYGEN SATURATION: 99 % | BODY MASS INDEX: 27.89 KG/M2 | HEART RATE: 79 BPM | DIASTOLIC BLOOD PRESSURE: 74 MMHG | WEIGHT: 165.4 LBS | RESPIRATION RATE: 16 BRPM | TEMPERATURE: 98.3 F | SYSTOLIC BLOOD PRESSURE: 117 MMHG

## 2025-07-16 DIAGNOSIS — R19.7 DIARRHEA OF PRESUMED INFECTIOUS ORIGIN: Primary | ICD-10-CM

## 2025-07-16 LAB
C DIFF GDH STL QL IA: POSITIVE
C DIFF TOX A+B STL QL IA: NEGATIVE
C DIFF TOX B STL QL: POSITIVE
LABORATORY COMMENT REPORT: ABNORMAL

## 2025-07-16 PROCEDURE — 87324 CLOSTRIDIUM AG IA: CPT | Mod: 59 | Performed by: PHYSICIAN ASSISTANT

## 2025-07-16 PROCEDURE — 99213 OFFICE O/P EST LOW 20 MIN: CPT | Performed by: PHYSICIAN ASSISTANT

## 2025-07-16 PROCEDURE — 1125F AMNT PAIN NOTED PAIN PRSNT: CPT | Performed by: PHYSICIAN ASSISTANT

## 2025-07-16 PROCEDURE — 3078F DIAST BP <80 MM HG: CPT | Performed by: PHYSICIAN ASSISTANT

## 2025-07-16 PROCEDURE — 3074F SYST BP LT 130 MM HG: CPT | Performed by: PHYSICIAN ASSISTANT

## 2025-07-16 PROCEDURE — 87493 C DIFF AMPLIFIED PROBE: CPT | Mod: 59 | Performed by: PHYSICIAN ASSISTANT

## 2025-07-16 PROCEDURE — 87507 IADNA-DNA/RNA PROBE TQ 12-25: CPT | Performed by: PHYSICIAN ASSISTANT

## 2025-07-16 ASSESSMENT — PAIN SCALES - GENERAL: PAINLEVEL_OUTOF10: MODERATE PAIN (6)

## 2025-07-16 NOTE — PROGRESS NOTES
Urgent Care Clinic Visit    Chief Complaint   Patient presents with    Diarrhea     Diarrhea started Friday, is going multiple times a day, tried imodium and pepto but is still going, is doing BRAT diet and staying hydrated, had IV antibiotics recently due to ER visit               7/16/2025     2:31 PM   Additional Questions   Roomed by Mandy   Accompanied by Self
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
## 2025-07-17 DIAGNOSIS — A04.72 C. DIFFICILE DIARRHEA: Primary | ICD-10-CM

## 2025-07-17 LAB
ADV 40+41 DNA STL QL NAA+NON-PROBE: NEGATIVE
ASTRO TYP 1-8 RNA STL QL NAA+NON-PROBE: NEGATIVE
C CAYETANENSIS DNA STL QL NAA+NON-PROBE: NEGATIVE
CAMPYLOBACTER DNA SPEC NAA+PROBE: NEGATIVE
CRYPTOSP DNA STL QL NAA+NON-PROBE: NEGATIVE
E COLI O157 DNA STL QL NAA+NON-PROBE: NORMAL
E HISTOLYT DNA STL QL NAA+NON-PROBE: NEGATIVE
EAEC ASTA GENE ISLT QL NAA+PROBE: NEGATIVE
EC STX1+STX2 GENES STL QL NAA+NON-PROBE: NEGATIVE
EPEC EAE GENE STL QL NAA+NON-PROBE: NEGATIVE
ETEC LTA+ST1A+ST1B TOX ST NAA+NON-PROBE: NEGATIVE
G LAMBLIA DNA STL QL NAA+NON-PROBE: NEGATIVE
NOROVIRUS GI+II RNA STL QL NAA+NON-PROBE: NEGATIVE
P SHIGELLOIDES DNA STL QL NAA+NON-PROBE: NEGATIVE
RVA RNA STL QL NAA+NON-PROBE: NEGATIVE
SALMONELLA SP RPOD STL QL NAA+PROBE: NEGATIVE
SAPO I+II+IV+V RNA STL QL NAA+NON-PROBE: NEGATIVE
SHIGELLA SP+EIEC IPAH ST NAA+NON-PROBE: NEGATIVE
V CHOLERAE DNA SPEC QL NAA+PROBE: NEGATIVE
VIBRIO DNA SPEC NAA+PROBE: NEGATIVE
Y ENTEROCOL DNA STL QL NAA+PROBE: NEGATIVE

## 2025-07-17 NOTE — PROGRESS NOTES
Stool culture results discussed with patient.  Treatment sent to patient's preferred pharmacy.  Patient also had a question about her upcoming surgery and I informed her to discuss at her preop appointment today the fact that she now has a C. difficile infection and is starting treatment today.  Patient acknowledged understanding of the above plan.

## 2025-08-20 ENCOUNTER — OFFICE VISIT (OUTPATIENT)
Dept: OBGYN | Facility: CLINIC | Age: 30
End: 2025-08-20
Payer: COMMERCIAL

## 2025-08-20 VITALS
SYSTOLIC BLOOD PRESSURE: 109 MMHG | WEIGHT: 165 LBS | BODY MASS INDEX: 27.83 KG/M2 | HEART RATE: 87 BPM | TEMPERATURE: 98.5 F | DIASTOLIC BLOOD PRESSURE: 70 MMHG | OXYGEN SATURATION: 97 %

## 2025-08-20 DIAGNOSIS — R10.2 PELVIC PAIN IN FEMALE: ICD-10-CM

## 2025-08-20 DIAGNOSIS — N94.6 DYSMENORRHEA: Primary | ICD-10-CM

## 2025-08-20 PROCEDURE — 3074F SYST BP LT 130 MM HG: CPT | Performed by: GENERAL PRACTICE

## 2025-08-20 PROCEDURE — 99214 OFFICE O/P EST MOD 30 MIN: CPT | Performed by: GENERAL PRACTICE

## 2025-08-20 PROCEDURE — 3078F DIAST BP <80 MM HG: CPT | Performed by: GENERAL PRACTICE
